# Patient Record
Sex: FEMALE | Race: WHITE
[De-identification: names, ages, dates, MRNs, and addresses within clinical notes are randomized per-mention and may not be internally consistent; named-entity substitution may affect disease eponyms.]

---

## 2019-08-31 ENCOUNTER — HOSPITAL ENCOUNTER (EMERGENCY)
Dept: HOSPITAL 41 - JD.ED | Age: 24
Discharge: HOME | End: 2019-08-31
Payer: COMMERCIAL

## 2019-08-31 DIAGNOSIS — Z98.890: ICD-10-CM

## 2019-08-31 DIAGNOSIS — O21.9: Primary | ICD-10-CM

## 2019-08-31 DIAGNOSIS — R42: ICD-10-CM

## 2019-08-31 DIAGNOSIS — Z3A.01: ICD-10-CM

## 2019-08-31 PROCEDURE — 99283 EMERGENCY DEPT VISIT LOW MDM: CPT

## 2019-08-31 PROCEDURE — 96375 TX/PRO/DX INJ NEW DRUG ADDON: CPT

## 2019-08-31 PROCEDURE — 83690 ASSAY OF LIPASE: CPT

## 2019-08-31 PROCEDURE — 84702 CHORIONIC GONADOTROPIN TEST: CPT

## 2019-08-31 PROCEDURE — 85007 BL SMEAR W/DIFF WBC COUNT: CPT

## 2019-08-31 PROCEDURE — 96361 HYDRATE IV INFUSION ADD-ON: CPT

## 2019-08-31 PROCEDURE — 96374 THER/PROPH/DIAG INJ IV PUSH: CPT

## 2019-08-31 PROCEDURE — 80053 COMPREHEN METABOLIC PANEL: CPT

## 2019-08-31 PROCEDURE — 85027 COMPLETE CBC AUTOMATED: CPT

## 2019-08-31 PROCEDURE — 86140 C-REACTIVE PROTEIN: CPT

## 2019-08-31 PROCEDURE — 36415 COLL VENOUS BLD VENIPUNCTURE: CPT

## 2019-08-31 PROCEDURE — 81001 URINALYSIS AUTO W/SCOPE: CPT

## 2019-08-31 NOTE — EDM.PDOC
ED HPI GENERAL MEDICAL PROBLEM





- General


Chief Complaint: Gastrointestinal Problem


Stated Complaint: 8 WKS PG. DIZZY,THROWING UP


Time Seen by Provider: 19 13:47


Source of Information: Reports: Patient


History Limitations: Reports: No Limitations





- History of Present Illness


INITIAL COMMENTS - FREE TEXT/NARRATIVE: 





Patient is a 24-year-old female who is approximately 8 weeks pregnant who 

presents to the ED with severe nausea with vomiting. This has been going on for 

the past few weeks. States she's been evaluated by her OB/GYN at 7 weeks 

pregnancy with no concerns. She was started on Zofran to which the patient has 

been taking and continues to have episodes of emesis. States last night the 

emesis progressively got worse. She's been experiencing intermittent episodes 

of vertigo. States she has the sensation of the room spinning with body 

position changes and also with turning her head left to right. Symptoms come 

and go. Has not had associated headache, vision changes, or any focal 

neurological deficits associated with this. She denies any chest pain, 

shortness of breath, abdominal pain, painful urination, vaginal bleeding, or 

any additional complaints. She states this morning after vomiting last time at 

0600 her urine was quite dark. She was able to drink some vitamin water only to 

vomit a hour later. Her urine was straw colored with admission to the ED. 

Pregnancy history  2, para 0, miscarriage 1.





She has no additional past medical history and currently taking prenatal 

vitamins, B6, Unisom, and Zofran. She denies smoking, alcohol use, or 

recreational drugs. Surgical history tonsillectomy, wisdom teeth, and plastic 

surgery to the lip. OB/GYN specialist is Dr. Mckinnon. 








- Related Data


 Allergies











Allergy/AdvReac Type Severity Reaction Status Date / Time


 


No Known Allergies Allergy   Verified 19 13:20











Home Meds: 


 Home Meds





Meclizine [Antivert] 12.5 mg PO TID PRN #21 tab 19 [Rx]


Ondansetron [Zofran ODT] 4 mg PO ASDIRECTED PRN #20 tab.dis 19 [Rx]











Past Medical History





- Past Surgical History


HEENT Surgical History: Reports: Tonsillectomy





Social & Family History





- Tobacco Use


Smoking Status *Q: Never Smoker


Second Hand Smoke Exposure: No





- Caffeine Use


Caffeine Use: Reports: None





- Recreational Drug Use


Recreational Drug Use: No





ED ROS GENERAL





- Review of Systems


Review Of Systems: ROS reveals no pertinent complaints other than HPI.





ED EXAM PREGNANCY





- Physical Exam


Exam: See Below


Exam Limited By: No Limitations


General Appearance: Alert, WD/WN, Mild Distress


Eye Exam: Bilateral Eye: Nystagmus (none noted), PERRL, Vision Changes (none 

noted)


Ears: Hearing Grossly Normal


Nose: Normal Inspection


Throat/Mouth: Normal Inspection, Normal Oropharynx, Normal Voice, No Airway 

Compromise


Head: Atraumatic, Normocephalic


Neck: Normal Inspection, Supple


Respiratory/Chest: No Respiratory Distress, Lungs Clear, Normal Breath Sounds, 

No Accessory Muscle Use, Chest Non-Tender


Cardiovascular: Normal Peripheral Pulses, Regular Rate, Rhythm


GI/Abdominal Exam: Normal Bowel Sounds, Soft, Non-Tender, No Organomegaly, No 

Distention


Back Exam: Normal Inspection.  No: CVA Tenderness (L), CVA Tenderness (R)


Extremities: Normal Inspection, Normal Range of Motion, Non-Tender, No Pedal 

Edema


Neurological: Alert, Oriented, CN II-XII Intact, Normal Cognition, No Motor/

Sensory Deficits


Psychiatric: Normal Affect, Normal Mood


Skin Exam: Warm, Dry, Intact, Normal Color





Course





- Vital Signs


Last Recorded V/S: 


 Last Vital Signs











Temp  98.7 F   19 13:21


 


Pulse  72   19 13:21


 


Resp  13   19 13:21


 


BP  98/68   19 13:21


 


Pulse Ox  100   19 13:21














- Orders/Labs/Meds


Orders: 


 Active Orders 24 hr











 Category Date Time Status


 


 Peripheral IV Care [RC] .AS DIRECTED Care  19 14:02 Active


 


 Peripheral IV Insertion Adult [OM.PC] Routine Oth  19 14:01 Ordered











Labs: 


 Laboratory Tests











  19 Range/Units





  13:35 13:35 15:45 


 


WBC  18.76 H    (3.98-10.04)  K/mm3


 


RBC  4.68    (3.98-5.22)  M/mm3


 


Hgb  14.1    (11.2-15.7)  gm/L


 


Hct  40.5    (34.1-44.9)  %


 


MCV  86.5    (79.4-94.8)  fl


 


MCH  30.1    (25.6-32.2)  pg


 


MCHC  34.8    (32.2-35.5)  g/dl


 


RDW Std Deviation  39.0    (36.4-46.3)  fL


 


Plt Count  193    (182-369)  K/mm3


 


MPV  13.4 H    (9.4-12.3)  fl


 


Neutrophils % (Manual)  90 H    (40-60)  %


 


Band Neutrophils %  0    (0-10)  %


 


Lymphocytes % (Manual)  5 L    (20-40)  %


 


Atypical Lymphs %  0    %


 


Monocytes % (Manual)  4    (2-10)  %


 


Eosinophils % (Manual)  0 L    (0.7-5.8)  %


 


Basophils % (Manual)  1    (0.1-1.2)  


 


Platelet Estimate  Adequate    


 


Plt Morphology Comment      


 


RBC Morph Comment  Normal    


 


Sodium   139   (136-145)  mEq/L


 


Potassium   3.8   (3.5-5.1)  mEq/L


 


Chloride   104   ()  mEq/L


 


Carbon Dioxide   23   (21-32)  mEq/L


 


Anion Gap   15.8 H   (5-15)  


 


BUN   7   (7-18)  mg/dL


 


Creatinine   0.7   (0.55-1.02)  mg/dL


 


Est Cr Clr Drug Dosing   115.01   mL/min


 


Estimated GFR (MDRD)   > 60   (>60)  mL/min


 


BUN/Creatinine Ratio   10.0 L   (14-18)  


 


Glucose   91   ()  mg/dL


 


Calcium   9.7   (8.5-10.1)  mg/dL


 


Total Bilirubin   0.6   (0.2-1.0)  mg/dL


 


AST   53 H   (15-37)  U/L


 


ALT   139 H   (14-59)  U/L


 


Alkaline Phosphatase   79   ()  U/L


 


C-Reactive Protein   0.3   (<1.0)  mg/dL


 


Total Protein   7.3   (6.4-8.2)  g/dl


 


Albumin   3.8   (3.4-5.0)  g/dl


 


Globulin   3.5   gm/dL


 


Albumin/Globulin Ratio   1.1   (1-2)  


 


Lipase   107   ()  U/L


 


HCG, Quant   394512.0   mIU/mL


 


Urine Color    Yellow  (Yellow)  


 


Urine Appearance    Clear  (Clear)  


 


Urine pH    6.0  (5.0-8.0)  


 


Ur Specific Gravity    1.020  (1.005-1.030)  


 


Urine Protein    Negative  (Negative)  


 


Urine Glucose (UA)    Negative  (Negative)  


 


Urine Ketones    1+ H  (Negative)  


 


Urine Occult Blood    Negative  (Negative)  


 


Urine Nitrite    Negative  (Negative)  


 


Urine Bilirubin    Negative  (Negative)  


 


Urine Urobilinogen    0.2  (0.2-1.0)  


 


Ur Leukocyte Esterase    Negative  (Negative)  


 


Urine RBC    0-5  (0-5)  /hpf


 


Urine WBC    0-5  (0-5)  /hpf


 


Ur Squamous Epith Cells    0-5  (0-5)  /hpf


 


Urine Bacteria    Few  (FEW)  /hpf


 


Urine Mucus    Moderate H  (FEW)  /hpf











Meds: 


Medications














Discontinued Medications














Generic Name Dose Route Start Last Admin





  Trade Name Freq  PRN Reason Stop Dose Admin


 


Diphenhydramine HCl  50 mg  19 14:01  19 14:19





  Benadryl  IVPUSH  19 14:02  50 mg





  ONETIME ONE   Administration





     





     





     





     


 


Sodium Chloride  1,000 mls @ 999 mls/hr  19 14:01  19 14:19





  Normal Saline  IV  19 15:01  999 mls/hr





  ONETIME ONE   Administration





     





     





     





     


 


Ondansetron HCl  4 mg  19 14:01  19 14:19





  Zofran  IVPUSH  19 14:02  4 mg





  ONETIME ONE   Administration





     





     





     





     


 


Sodium Chloride  10 ml  19 14:01  19 14:20





  Saline Flush  FLUSH   10 ml





  ASDIRECTED PRN   Administration





  Keep Vein Open   





     





     





     














- Re-Assessments/Exams


Free Text/Narrative Re-Assessment/Exam: 





On exam patient has no focal neurological deficits. Mouth is mildly dry. Will 

order IV with normal saline bolus 1 L, Zofran 4 mg IVP, and Benadryl 50 mg by 

IV. Initial labs and studies will include: CBC, chem 14, HCG quantitative, CRP, 

lipase, and urinalysis.





Labs reviewed:WBC 18.76, Hgb 14.1, N% 90, N# 0  sodium potassium, chloride, CO2 

are normal. AG mildly elevated 15.8. Creatinine 0.7. Glucose 91. AST 53 and ALT 

are 139. CRP normal. HCG quantitative 257,262.  UA positive for ketones and 

moderate mucus.





1600 Reassessment, patients vitals are stable.  She has had no further vertigo 

episodes and nausea.





1647 Per Nursing staff patient is tolerating the crackers and jello. I will 

discharge patient home.  Return precautions discussed with the patient and 

. She had no further questions or concerns and agreed with plan. 

Discharge instructions as documented. 








Departure





- Departure


Time of Disposition: 16:59


Disposition: Home, Self-Care 01


Condition: Good


Clinical Impression: 


 Vomiting during pregnancy, Vertigo, Pregnancy test positive, Vomiting





Nausea & vomiting


Qualifiers:


 Vomiting type: unspecified Vomiting Intractability: non-intractable Qualified 

Code(s): R11.2 - Nausea with vomiting, unspecified








- Discharge Information


Prescriptions: 


Meclizine [Antivert] 12.5 mg PO TID PRN #21 tab


 PRN Reason: Dizziness


Ondansetron [Zofran ODT] 4 mg PO ASDIRECTED PRN #20 tab.dis


 PRN Reason: Vomiting


Instructions:  Eating Plan for Hyperemesis Gravidarum, Hyperemesis Gravidarum, 

Vertigo, Easy-to-Read, Warning Signs During Pregnancy, Nausea and Vomiting, 

Adult


Referrals: 


Isidra Robles MD [Primary Care Provider] - 


Forms:  ED Department Discharge


Additional Instructions: 


Continue to drink small amounts of liquids more frequently such as: Gatorade, 

Powerade, Pedialyte, and/or vitamin water. Eat small amounts of crackers, Jell-O

, soup as tolerated. Refrain from any items that cause nausea and vomiting. 

Utilize the Zofran as prescribed for nausea and vomiting. In addition for the 

intermittent vertigo may utilize meclizine 12.5 mg 3 times a day. PRN.  Please 

call and schedule an appt with OB/GYN for this coming week for reevaluation. 

Please return to the E.D. for any new or worsening symptoms. 





- My Orders


Last 24 Hours: 


My Active Orders





19 14:01


Peripheral IV Insertion Adult [OM.PC] Routine 





19 14:02


Peripheral IV Care [RC] .AS DIRECTED 














- Assessment/Plan


Last 24 Hours: 


My Active Orders





19 14:01


Peripheral IV Insertion Adult [OM.PC] Routine 





19 14:02


Peripheral IV Care [RC] .AS DIRECTED

## 2020-04-10 ENCOUNTER — HOSPITAL ENCOUNTER (INPATIENT)
Dept: HOSPITAL 41 - JD.OBCHECK | Age: 25
LOS: 2 days | Discharge: HOME | DRG: 560 | End: 2020-04-12
Attending: OBSTETRICS & GYNECOLOGY | Admitting: OBSTETRICS & GYNECOLOGY
Payer: COMMERCIAL

## 2020-04-10 DIAGNOSIS — O42.92: ICD-10-CM

## 2020-04-10 DIAGNOSIS — O48.0: Primary | ICD-10-CM

## 2020-04-10 DIAGNOSIS — Z20.828: ICD-10-CM

## 2020-04-10 DIAGNOSIS — Z3A.40: ICD-10-CM

## 2020-04-10 PROCEDURE — 3E0R3BZ INTRODUCTION OF ANESTHETIC AGENT INTO SPINAL CANAL, PERCUTANEOUS APPROACH: ICD-10-PCS | Performed by: OBSTETRICS & GYNECOLOGY

## 2020-04-10 PROCEDURE — 10907ZC DRAINAGE OF AMNIOTIC FLUID, THERAPEUTIC FROM PRODUCTS OF CONCEPTION, VIA NATURAL OR ARTIFICIAL OPENING: ICD-10-PCS | Performed by: OBSTETRICS & GYNECOLOGY

## 2020-04-10 PROCEDURE — U0002 COVID-19 LAB TEST NON-CDC: HCPCS

## 2020-04-10 PROCEDURE — 0HQ9XZZ REPAIR PERINEUM SKIN, EXTERNAL APPROACH: ICD-10-PCS | Performed by: OBSTETRICS & GYNECOLOGY

## 2020-04-10 PROCEDURE — P9016 RBC LEUKOCYTES REDUCED: HCPCS

## 2020-04-10 RX ADMIN — FENTANYL CITRATE PRN MCG: 50 INJECTION, SOLUTION INTRAMUSCULAR; INTRAVENOUS at 22:25

## 2020-04-10 RX ADMIN — FENTANYL CITRATE PRN MCG: 50 INJECTION, SOLUTION INTRAMUSCULAR; INTRAVENOUS at 14:55

## 2020-04-10 NOTE — PCM.PNLD
Labor Progress Note





- VS & Meds


Vital Signs: 


 Last Vital Signs











Temp  36.6 C   04/10/20 05:32


 


Pulse  94   04/10/20 05:32


 


Resp  16   04/10/20 05:32


 


BP  119/74   04/10/20 05:32


 


Pulse Ox  99   04/10/20 05:32











Active Medications: 


 Current Medications





Acetaminophen (Tylenol)  650 mg PO Q4H PRN


   PRN Reason: Pain (Mild 1-3) and fever


Calcium Carbonate/Glycine (Tums)  1,000 mg PO Q2H PRN


   PRN Reason: Indigestion


Lactated Ringer's (Ringers, Lactated)  1,000 mls @ 100 mls/hr IV ASDIRECTED MURALI


   Last Admin: 04/10/20 14:03 Dose:  999 mls/hr


Oxytocin/Lactated Ringer's (Pitocin In Lr 10 Units/1,000 Ml)  10 unit in 1,000 

mls @ 12 mls/hr IV TITRATE MURALI; Protocol


Oxytocin/Lactated Ringer's (Pitocin In Lr 10 Units/1,000 Ml)  10 unit in 1,000 

mls @ 100 mls/hr IV .CONTINUOUS MURALI


Nalbuphine HCl (Nubain)  10 mg IVPUSH Q2H PRN


   PRN Reason: Pain


   Last Admin: 04/10/20 12:01 Dose:  10 mg


Ondansetron HCl (Zofran)  4 mg IVPUSH Q4H PRN


   PRN Reason: Nausea/Vomiting


Sodium Chloride (Saline Flush)  10 ml FLUSH ASDIRECTED PRN


   PRN Reason: Keep Vein Open











- Uterine Contractions


Uterine Monitoring Mode: External [place]


Contraction Intensity: Moderate to Strong


Uterine Resting Tone: Soft





- Fetal Monitoring


Fetal Monitor Mode: External Ultrasound


Fetal Heart Rate (FHR) Baseline: 135


Fetal Heart Rate (FHR) Variability: Moderate (6-25 bmp)


Fetal Accelerations: Present, 15x15


Fetal Decelerations: None


Fetal Strip Review: Category I





- Vaginal Exam


Dilation (cm): 6


Effacement (Percent): 90


Station: -1


Cervical Position: Anterior





- Labor Progress (Free Text)


Labor Progress: 





Doing well, but more uncomfortable.  Had dose of nubain, but not relieving 

pain.  Requesting epidural.  





Otherwise will plan on sign out to Dr. Mckinnon at 1600

## 2020-04-10 NOTE — PCM.SN
- Free Text/Narrative


Note: 





Stage I - patient presented with labor. Epidural for anesthesia. AROM of 

meconium stained fluid. Progressed slowly to complete with overall reassuring 

FHT.





Stage II - Patient pushed 3 hours and expressed desire for assistance. With 

head at +2/5 station in JF position vacuum applied. Over three contractions 

with good maternal effort head brought down to . With continued 

maternal effort without vacuum head delivered. Shoulder dystocia reduced with 

alyssa and suprapubic pressure over 90 seconds. Cord clamped and cut and 

limp baby taken to warmer.  





Stage III -  of placenta. Significant bleeding immediately. Pitocin 

initiated. Bimanual exam revealed placental fragments. Fentanyl given and 

manual evacuation of clot and fragments of placenta on anterior wall of uterus. 

Buccal cytotec x 600 given. CBC ordered. Bladder drained. Small first degree 

laceration repaired with 3-0 vicryl.

## 2020-04-10 NOTE — PCM.LDHP
L&D History of Present Illness





- General


Date of Service: 04/10/20


Admit Problem/Dx: 


 Patient Status Order with Admit Dx/Problem





04/10/20 06:10


Patient Status [ADT] Routine 








 Admission Diagnosis/Problem











Admission Diagnosis/Problem    Pregnancy














Source of Information: Patient


History Limitations: Reports: No Limitations





- History of Present Illness


Introduction:: 





Patient is a 24 y/o  at 40 4/7 wks present with SROM/early labor.  

Thinks this started around 2300 yesterday.  Small amounts that are slightly 

yellow.  No other issues. 





- Related Data


Allergies/Adverse Reactions: 


 Allergies











Allergy/AdvReac Type Severity Reaction Status Date / Time


 


No Known Allergies Allergy   Verified 04/10/20 05:25











Home Medications: 


 Home Meds





Meclizine [Antivert] 12.5 mg PO TID PRN #21 tab 19 [Rx]


Ondansetron [Zofran ODT] 4 mg PO ASDIRECTED PRN #20 tab.dis 19 [Rx]


Calcium Carbonate [Tums] 500 mg PO 04/10/20 [History]











Past Medical History


Gastrointestinal History: Reports: GERD


OB/GYN History: Reports: Pregnancy, Spontaneous 


: 2


Para: 0


LMP (Approximate): Pregnant





- Past Surgical History


HEENT Surgical History: Reports: Oral Surgery (tooth extraction), Tonsillectomy





Social & Family History





- Family History


Family Medical History: Noncontributory





- Tobacco Use


Smoking Status *Q: Never Smoker


Second Hand Smoke Exposure: No





- Caffeine Use


Caffeine Use: Reports: Tea





- Alcohol Use


Alcohol Use History: No





- Recreational Drug Use


Recreational Drug Use: No





H&P Review of Systems





- Review of Systems:


Review Of Systems: See Below


General: Reports: No Symptoms


Pulmonary: Reports: No Symptoms


Cardiovascular: Reports: No Symptoms


Gastrointestinal: Reports: No Symptoms


Genitourinary: Reports: No Symptoms


Musculoskeletal: Reports: Back Pain


Psychiatric: Reports: No Symptoms


Neurological: Reports: No Symptoms





L&D Exam





- Exam


Exam: See Below





- Vital Signs


Vital Signs: 


 Last Vital Signs











Temp  36.6 C   04/10/20 05:32


 


Pulse  94   04/10/20 05:32


 


Resp  16   04/10/20 05:32


 


BP  119/74   04/10/20 05:32


 


Pulse Ox  99   04/10/20 05:32











Weight: 103.646 kg





- OB Specific


Contraction Intensity: Mild to Moderate


Fetal Movement: Active


Fetal Heart Tones: Present


Fetal Heart Tones per Min: 145


Fetal Heart Rate (FHR) Variability: Moderate (6-25 bmp)


Birth Presentation: Vertex





- Glaser Score


Glaser Score Cervix Position: Midposition


Glaser Score Consistency: Soft


Glaser Score Effacement: >80%


Glaser Score Dilation: > 5 cm


Glaser Score Infant's Station: -1 ,0


Glaser Score Total: 11





- Exam


General: Alert, Oriented, Cooperative


Lungs: Clear to Auscultation, Normal Respiratory Effort


Cardiovascular: Regular Rate, Regular Rhythm


GI/Abdominal Exam: Soft, Non-Tender


Genitourinary: Normal external exam


Extremities: Normal Inspection


Skin: Warm, Dry, Intact





- Patient Data


Lab Results Last 24 hrs: 


 Laboratory Results - last 24 hr











  04/10/20 04/10/20 Range/Units





  05:38 06:25 


 


WBC   16.70 H  (3.98-10.04)  K/mm3


 


RBC   4.39  (3.98-5.22)  M/mm3


 


Hgb   12.5  D  (11.2-15.7)  gm/dl


 


Hct   38.2  (34.1-44.9)  %


 


MCV   87.0  (79.4-94.8)  fl


 


MCH   28.5  (25.6-32.2)  pg


 


MCHC   32.7  (32.2-35.5)  g/dl


 


RDW Std Deviation   44.7  (36.4-46.3)  fL


 


Plt Count   114 L D  (182-369)  K/mm3


 


MPV   12.8 H  (9.4-12.3)  fl


 


Neut % (Auto)   75.8 H  (34.0-71.1)  %


 


Lymph % (Auto)   13.0 L  (19.3-51.7)  %


 


Mono % (Auto)   10.1  (4.7-12.5)  %


 


Eos % (Auto)   0.5 L  (0.7-5.8)  


 


Baso % (Auto)   0.2  (0.1-1.2)  %


 


Neut # (Auto)   12.66 H  (1.56-6.13)  K/mm3


 


Lymph # (Auto)   2.17  (1.18-3.74)  K/mm3


 


Mono # (Auto)   1.68 H  (0.24-0.36)  K/mm3


 


Eos # (Auto)   0.09  (0.04-0.36)  K/mm3


 


Baso # (Auto)   0.03  (0.01-0.08)  K/mm3


 


Manual Slide Review   Normal smear  


 


Fetal Membrane Rupture  Negative   











Result Diagrams: 


 04/10/20 06:25








- Problem List


(1) 40 weeks gestation of pregnancy


SNOMED Code(s): 38831983


   ICD Code: Z3A.40 - 40 WEEKS GESTATION OF PREGNANCY   Status: Acute   Current 

Visit: Yes   





(2) Rh negative state in antepartum period


SNOMED Code(s): 373804388


   ICD Code: O26.899 - OTH PREGNANCY RELATED CONDITIONS, UNSPECIFIED TRIMESTER; 

Z67.91 - UNSPECIFIED BLOOD TYPE, RH NEGATIVE   Status: Acute   Current Visit: 

Yes   





(3) Rubella non-immune status, antepartum


SNOMED Code(s): 529063413


   ICD Code: O99.89 - OTH DISEASES AND CONDITIONS COMPL PREG/CHLDBRTH; Z28.3 - 

UNDERIMMUNIZATION STATUS   Status: Acute   Current Visit: Yes   





(4) Premature rupture of membranes


SNOMED Code(s): 59341844


   ICD Code: O42.90 - WENDY ROM, 7TH0 BETW RUPT & ONST LABR, UNSP WEEKS OF GEST 

  Status: Acute   Current Visit: Yes   


Qualifiers: 


   PROM onset of labor timing: unspecified duration between rupture of 

membranes and onset of labor   PROM gestational age: full term   Qualified Code(

s): O42.92 - Full-term premature rupture of membranes, unspecified as to length 

of time between rupture and onset of labor   


Problem List Initiated/Reviewed/Updated: Yes


Orders Last 24hrs: 


 Active Orders 24 hr











 Category Date Time Status


 


 Patient Status [ADT] Routine ADT  04/10/20 06:10 Active


 


 Activity as Tolerated [RC] PFP Care  04/10/20 06:10 Active


 


 Communication Order [RC] ASDIRECTED Care  04/10/20 06:10 Active


 


 Fetal Heart Tones [RC] ASDIRECTED Care  04/10/20 06:10 Active


 


 Notify Provider [RC] PFP Care  04/10/20 06:10 Active


 


 Notify Provider [RC] PRN Care  04/10/20 06:10 Active


 


 Peripheral IV Care [RC] .AS DIRECTED Care  04/10/20 06:10 Active


 


 Urinary Catheter Assessment [RC] ASDIRECTED Care  04/10/20 06:09 Active


 


 Vital Signs [RC] PER UNIT ROUTINE Care  04/10/20 05:24 Active


 


 Vital Signs [RC] PER UNIT ROUTINE Care  04/10/20 06:10 Active


 


 Regular Diet [DIET] Diet  04/10/20 Breakfast Active


 


 RAPID PLASMA REAGIN,RPR [CHEM] Routine Lab  04/10/20 06:25 Received


 


 Acetaminophen [Tylenol] Med  04/10/20 06:09 Active





 650 mg PO Q4H PRN   


 


 Calcium Carbonate [Tums] Med  04/10/20 06:09 Active





 1,000 mg PO Q2H PRN   


 


 Lactated Ringers [Ringers, Lactated] 1,000 ml Med  04/10/20 06:15 Active





 IV ASDIRECTED   


 


 Nalbuphine [Nubain] Med  04/10/20 06:09 Active





 10 mg IVPUSH Q2H PRN   


 


 Ondansetron [Zofran] Med  04/10/20 06:09 Active





 4 mg IVPUSH Q4H PRN   


 


 Oxytocin/Lactated Ringers [Pitocin in LR 10 Units/1,000 Med  04/10/20 06:15 

Active





 ML]   





 10 unit in 1,000 ml IV .CONTINUOUS   


 


 Oxytocin/Lactated Ringers [Pitocin in LR 10 Units/1,000 Med  04/10/20 06:15 

Active





 ML]   





 10 unit in 1,000 ml IV TITRATE   


 


 Sodium Chloride 0.9% [Saline Flush] Med  04/10/20 06:09 Active





 10 ml FLUSH ASDIRECTED PRN   


 


 Electronic Fetal Heart Tones Ext w TOCO [WOMSER] Oth  04/10/20 06:10 Ordered





 Routine   


 


 Electronic Fetal Heart Tones Internal [WOMSER] Per Unit Oth  04/10/20 06:10 

Ordered





 Routine   


 


 Peripheral IV Insertion Adult [OM.PC] Routine Oth  04/10/20 06:10 Ordered


 


 Resuscitation Status Routine Resus Stat  04/10/20 05:24 Ordered








 Medication Orders





Acetaminophen (Tylenol)  650 mg PO Q4H PRN


   PRN Reason: Pain (Mild 1-3) and fever


Calcium Carbonate/Glycine (Tums)  1,000 mg PO Q2H PRN


   PRN Reason: Indigestion


Lactated Ringer's (Ringers, Lactated)  1,000 mls @ 100 mls/hr IV ASDIRECTED MURALI


Oxytocin/Lactated Ringer's (Pitocin In Lr 10 Units/1,000 Ml)  10 unit in 1,000 

mls @ 12 mls/hr IV TITRATE MURALI; Protocol


Oxytocin/Lactated Ringer's (Pitocin In Lr 10 Units/1,000 Ml)  10 unit in 1,000 

mls @ 100 mls/hr IV .CONTINUOUS MURALI


Nalbuphine HCl (Nubain)  10 mg IVPUSH Q2H PRN


   PRN Reason: Pain


Ondansetron HCl (Zofran)  4 mg IVPUSH Q4H PRN


   PRN Reason: Nausea/Vomiting


Sodium Chloride (Saline Flush)  10 ml FLUSH ASDIRECTED PRN


   PRN Reason: Keep Vein Open








Assessment/Plan Comment:: 





* Labs done


* GBS negative, no need for antibiotics 


* Rupture done of remainder of forebag 


* Pain management per patient preference 


* Anticipate

## 2020-04-10 NOTE — PCM.PREANE
Preanesthetic Assessment





- Anesthesia/Transfusion/Family Hx


Anesthesia History: Prior Anesthesia Without Reaction


Transfusion History: No Prior Transfusion(s)





- Review of Systems


General: No Symptoms


Pulmonary: No Symptoms


Cardiovascular: No Symptoms


Gastrointestinal: No Symptoms


Neurological: No Symptoms


Other: Reports: None





- Physical Assessment


NPO Status Date: 04/10/20


NPO Status Time: 04:00


Vital Signs: 





 Last Vital Signs











Temp  97.9 F   04/10/20 05:32


 


Pulse  94   04/10/20 05:32


 


Resp  16   04/10/20 05:32


 


BP  119/74   04/10/20 05:32


 


Pulse Ox  99   04/10/20 05:32











Height: 1.68 m


Weight: 103.646 kg


ASA Class: 2


Mental Status: Alert & Oriented x3


Airway Class: Mallampati = 2


Dentition: Reports: Normal Dentition


Thyro-Mental Finger Breadths: 3


Mouth Opening Finger Breadths: 3


ROM/Head Extension: Full


Lungs: Clear to Auscultation, Normal Respiratory Effort


Cardiovascular: Regular Rate, Regular Rhythm





- Lab


Values: 





 Laboratory Last Values











WBC  16.70 K/mm3 (3.98-10.04)  H  04/10/20  06:25    


 


RBC  4.39 M/mm3 (3.98-5.22)   04/10/20  06:25    


 


Hgb  12.5 gm/dl (11.2-15.7)  D 04/10/20  06:25    


 


Hct  38.2 % (34.1-44.9)   04/10/20  06:25    


 


MCV  87.0 fl (79.4-94.8)   04/10/20  06:25    


 


MCH  28.5 pg (25.6-32.2)   04/10/20  06:25    


 


MCHC  32.7 g/dl (32.2-35.5)   04/10/20  06:25    


 


RDW Std Deviation  44.7 fL (36.4-46.3)   04/10/20  06:25    


 


Plt Count  114 K/mm3 (182-369)  L D 04/10/20  06:25    


 


MPV  12.8 fl (9.4-12.3)  H  04/10/20  06:25    


 


Neut % (Auto)  75.8 % (34.0-71.1)  H  04/10/20  06:25    


 


Lymph % (Auto)  13.0 % (19.3-51.7)  L  04/10/20  06:25    


 


Mono % (Auto)  10.1 % (4.7-12.5)   04/10/20  06:25    


 


Eos % (Auto)  0.5  (0.7-5.8)  L  04/10/20  06:25    


 


Baso % (Auto)  0.2 % (0.1-1.2)   04/10/20  06:25    


 


Neut # (Auto)  12.66 K/mm3 (1.56-6.13)  H  04/10/20  06:25    


 


Lymph # (Auto)  2.17 K/mm3 (1.18-3.74)   04/10/20  06:25    


 


Mono # (Auto)  1.68 K/mm3 (0.24-0.36)  H  04/10/20  06:25    


 


Eos # (Auto)  0.09 K/mm3 (0.04-0.36)   04/10/20  06:25    


 


Baso # (Auto)  0.03 K/mm3 (0.01-0.08)   04/10/20  06:25    


 


Manual Slide Review  Normal smear   04/10/20  06:25    


 


Fetal Membrane Rupture  Negative   04/10/20  05:38    














- Allergies


Allergies/Adverse Reactions: 


 Allergies











Allergy/AdvReac Type Severity Reaction Status Date / Time


 


No Known Allergies Allergy   Verified 04/10/20 05:25














- Acknowledgements


Anesthesia Type Planned: Epidural ( )


Pt an Appropriate Candidate for the Planned Anesthesia: Yes


Alternatives and Risks of Anesthesia Discussed w Pt/Guardian: Yes


Pt/Guardian Understands and Agrees with Anesthesia Plan: Yes





PreAnesthesia Questionnaire





- Past Health History


Medical/Surgical History: Denies Medical/Surgical History


Gastrointestinal History: Reports: GERD (also pregnancy exacerbated)


OB/GYN History: Reports: Pregnancy, Spontaneous 


Endocrine/Metabolic History: Reports: Obesity/BMI 30+





- Past Surgical History


HEENT Surgical History: Reports: Oral Surgery (tooth extraction), Tonsillectomy





- SUBSTANCE USE


Smoking Status *Q: Never Smoker


Second Hand Smoke Exposure: No


Recreational Drug Use History: No





- HOME MEDS


Home Medications: 


 Home Meds





Meclizine [Antivert] 12.5 mg PO TID PRN #21 tab 19 [Rx]


Ondansetron [Zofran ODT] 4 mg PO ASDIRECTED PRN #20 tab.dis 19 [Rx]


Calcium Carbonate [Tums] 500 mg PO 04/10/20 [History]











- CURRENT (IN HOUSE) MEDS


Current Meds: 





 Current Medications





Acetaminophen (Tylenol)  650 mg PO Q4H PRN


   PRN Reason: Pain (Mild 1-3) and fever


Calcium Carbonate/Glycine (Tums)  1,000 mg PO Q2H PRN


   PRN Reason: Indigestion


Diphenhydramine HCl (Benadryl)  25 mg IVPUSH Q6H PRN


   PRN Reason: pruritis


Ephedrine Sulfate (Ephedrine Sulfate)  5 mg IVPUSH ASDIRECTED PRN


   PRN Reason: Hypotension


Fentanyl (Sublimaze)  100 mcg EPIDUR Q3H PRN


   PRN Reason: Pain


Fentanyl/Bupivacaine HCl (Fentanyl/Bupivacaine/Ns 2 Mcg-0.125% 100 Ml)  100 ml 

EPIDUR ASDIRECTED PRN


   PRN Reason: Pain


Lactated Ringer's (Ringers, Lactated)  1,000 mls @ 100 mls/hr IV ASDIRECTED MURALI


   Last Admin: 04/10/20 14:03 Dose:  999 mls/hr


Oxytocin/Lactated Ringer's (Pitocin In Lr 10 Units/1,000 Ml)  10 unit in 1,000 

mls @ 12 mls/hr IV TITRATE MURALI; Protocol


Oxytocin/Lactated Ringer's (Pitocin In Lr 10 Units/1,000 Ml)  10 unit in 1,000 

mls @ 100 mls/hr IV .CONTINUOUS MURALI


Nalbuphine HCl (Nubain)  10 mg IVPUSH Q2H PRN


   PRN Reason: Pain


   Last Admin: 04/10/20 12:01 Dose:  10 mg


Ondansetron HCl (Zofran)  4 mg IVPUSH Q4H PRN


   PRN Reason: Nausea/Vomiting


Sodium Chloride (Saline Flush)  10 ml FLUSH ASDIRECTED PRN


   PRN Reason: Keep Vein Open

## 2020-04-10 NOTE — PCM.PNLD
Labor Progress Note





- VS & Meds


Vital Signs: 


 Last Vital Signs











Temp  36.6 C   04/10/20 05:32


 


Pulse  94   04/10/20 05:32


 


Resp  16   04/10/20 05:32


 


BP  119/74   04/10/20 05:32


 


Pulse Ox  99   04/10/20 05:32











Active Medications: 


 Current Medications





Acetaminophen (Tylenol)  650 mg PO Q4H PRN


   PRN Reason: Pain (Mild 1-3) and fever


Calcium Carbonate/Glycine (Tums)  1,000 mg PO Q2H PRN


   PRN Reason: Indigestion


Diphenhydramine HCl (Benadryl)  25 mg IVPUSH Q6H PRN


   PRN Reason: pruritis


Ephedrine Sulfate (Ephedrine Sulfate)  5 mg IVPUSH ASDIRECTED PRN


   PRN Reason: Hypotension


Fentanyl (Sublimaze)  100 mcg EPIDUR Q3H PRN


   PRN Reason: Pain


   Last Admin: 04/10/20 14:55 Dose:  100 mcg


Fentanyl/Bupivacaine HCl (Fentanyl/Bupivacaine/Ns 2 Mcg-0.125% 100 Ml)  100 ml 

EPIDUR ASDIRECTED PRN


   PRN Reason: Pain


   Last Admin: 04/10/20 14:55 Dose:  100 ml


Lactated Ringer's (Ringers, Lactated)  1,000 mls @ 100 mls/hr IV ASDIRECTED MURALI


   Last Infusion: 04/10/20 16:02 Dose:  100 mls/hr


Oxytocin/Lactated Ringer's (Pitocin In Lr 10 Units/1,000 Ml)  10 unit in 1,000 

mls @ 12 mls/hr IV TITRATE MURALI; Protocol


Oxytocin/Lactated Ringer's (Pitocin In Lr 10 Units/1,000 Ml)  10 unit in 1,000 

mls @ 100 mls/hr IV .CONTINUOUS MURALI


Nalbuphine HCl (Nubain)  10 mg IVPUSH Q2H PRN


   PRN Reason: Pain


   Last Admin: 04/10/20 12:01 Dose:  10 mg


Ondansetron HCl (Zofran)  4 mg IVPUSH Q4H PRN


   PRN Reason: Nausea/Vomiting


Sodium Chloride (Saline Flush)  10 ml FLUSH ASDIRECTED PRN


   PRN Reason: Keep Vein Open











- Uterine Contractions


Uterine Monitoring Mode: External [address] [place] [address]


Contraction Intensity: Moderate to Strong


Uterine Resting Tone: Soft





- Fetal Monitoring


Fetal Monitor Mode: External Ultrasound


Fetal Heart Rate (FHR) Baseline: 135


Fetal Heart Rate (FHR) Variability: Moderate (6-25 bmp)


Fetal Accelerations: Present, 15x15


Fetal Decelerations: None


Fetal Strip Review: Category I





- Vaginal Exam


Dilation (cm): 10


Effacement (Percent): 100


Station: 0


Cervical Position: Anterior





- Labor Progress (Free Text)


Labor Progress: 





Pushing well. Comfortable with epidural.

## 2020-04-11 PROCEDURE — 8E0ZXY6 ISOLATION: ICD-10-PCS | Performed by: OBSTETRICS & GYNECOLOGY

## 2020-04-11 RX ADMIN — WITCH HAZEL PRN TUB: 500 SOLUTION RECTAL; TOPICAL at 00:15

## 2020-04-11 RX ADMIN — Medication PRN CAN: at 00:15

## 2020-04-11 NOTE — PCM.PNPP
- General Info


Date of Service: 20


Subjective Update: 





Only complaint significant shortness of breath. No pain. No chest pain or 

palpitations. 


Functional Status: Reports: Pain Controlled, Tolerating Diet, Urinating.  Denies

: Ambulating





- Review of Systems


General: Reports: No Symptoms


HEENT: Reports: No Symptoms


Pulmonary: Reports: Shortness of Breath.  Denies: Pleuritic Chest Pain, Wheezing


Cardiovascular: Reports: No Symptoms


Gastrointestinal: Reports: No Symptoms


Genitourinary: Reports: No Symptoms


Musculoskeletal: Reports: No Symptoms


Skin: Reports: No Symptoms


Neurological: Reports: No Symptoms


Psychiatric: Reports: No Symptoms





- General Info


Date of Service: 20





- Patient Data


Vital Signs - Most Recent: 


 Last Vital Signs











Temp  36.7 C   20 10:14


 


Pulse  88   20 10:14


 


Resp  28 H  20 10:14


 


BP  95/61   20 10:14


 


Pulse Ox  94 L  20 10:14











Weight - Most Recent: 103.646 kg


I&O - Last 24 Hours: 


 Intake & Output











 04/10/20 04/11/20 04/11/20





 22:59 06:59 14:59


 


Intake Total 1000 1600 1


 


Output Total 300  


 


Balance 700 1600 1











Lab Results - Last 24 Hours: 


 Laboratory Results - last 24 hr











  04/10/20 04/10/20 04/11/20 Range/Units





  06:25 21:50 04:06 


 


WBC   27.80 H   (3.98-10.04)  K/mm3


 


RBC   3.88 L   (3.98-5.22)  M/mm3


 


Hgb   11.1 L   (11.2-15.7)  gm/dl


 


Hct   34.1   (34.1-44.9)  %


 


MCV   87.9   (79.4-94.8)  fl


 


MCH   28.6   (25.6-32.2)  pg


 


MCHC   32.6   (32.2-35.5)  g/dl


 


RDW Std Deviation   44.4   (36.4-46.3)  fL


 


Plt Count   139 L   (182-369)  K/mm3


 


MPV   13.3 H   (9.4-12.3)  fl


 


Neut % (Auto)   86.1 H   (34.0-71.1)  %


 


Lymph % (Auto)   3.8 L   (19.3-51.7)  %


 


Mono % (Auto)   9.6   (4.7-12.5)  %


 


Eos % (Auto)   0 L   (0.7-5.8)  


 


Baso % (Auto)   0.1   (0.1-1.2)  %


 


Neut # (Auto)   23.94 H   (1.56-6.13)  K/mm3


 


Lymph # (Auto)   1.06 L   (1.18-3.74)  K/mm3


 


Mono # (Auto)   2.67 H   (0.24-0.36)  K/mm3


 


Eos # (Auto)   0.00 L   (0.04-0.36)  K/mm3


 


Baso # (Auto)   0.02   (0.01-0.08)  K/mm3


 


Manual Slide Review   Abnormal smear   


 


RPR  Non-reactive    (NONREACTIVE)  


 


Blood Type    A NEGATIVE  


 


Gel Antibody Screen    Positive  


 


Fetal Screen    3 ros/5 flds - neg  


 


RhIG Candidate?    Yes  


 


Rhogam Indicated    Yes, baby rh pos H  














  20 Range/Units





  04:06 


 


WBC  28.19 H  (3.98-10.04)  K/mm3


 


RBC  3.31 L  (3.98-5.22)  M/mm3


 


Hgb  9.3 L D  (11.2-15.7)  gm/dl


 


Hct  29.0 L  (34.1-44.9)  %


 


MCV  87.6  (79.4-94.8)  fl


 


MCH  28.1  (25.6-32.2)  pg


 


MCHC  32.1 L  (32.2-35.5)  g/dl


 


RDW Std Deviation  43.2  (36.4-46.3)  fL


 


Plt Count  140 L  (182-369)  K/mm3


 


MPV  13.2 H  (9.4-12.3)  fl


 


Neut % (Auto)  80.8 H  (34.0-71.1)  %


 


Lymph % (Auto)  5.8 L  (19.3-51.7)  %


 


Mono % (Auto)  12.9 H  (4.7-12.5)  %


 


Eos % (Auto)  0 L  (0.7-5.8)  


 


Baso % (Auto)  0.1  (0.1-1.2)  %


 


Neut # (Auto)  22.77 H  (1.56-6.13)  K/mm3


 


Lymph # (Auto)  1.63  (1.18-3.74)  K/mm3


 


Mono # (Auto)  3.65 H  (0.24-0.36)  K/mm3


 


Eos # (Auto)  0.00 L  (0.04-0.36)  K/mm3


 


Baso # (Auto)  0.02  (0.01-0.08)  K/mm3


 


Manual Slide Review  Abnormal smear  


 


RPR   (NONREACTIVE)  


 


Blood Type   


 


Gel Antibody Screen   


 


Fetal Screen   


 


RhIG Candidate?   


 


Rhogam Indicated   











Micro Results - Last 24 Hours: 


 Microbiology











 20 07:50 Respiratory Syncytial Virus Ag Scrn - Final





 Nasopharyngeal Swab    NEGATIVE RSV ANTIGEN





    REFERENCE RANGE: NEGATIVE


 


 20 07:50 Influenza Type A Antigen Screen - Final





 Nasopharyngeal Swab    NEGATIVE INFLUENZA A VIRUS AG





    REFERENCE RANGE: NEGATIVE





 Influenza Type B Antigen Screen - Final





    NEGATIVE INFLUENZA B VIRUS AG





    REFERENCE RANGE: NEGATIVE











Med Orders - Current: 


 Current Medications





Acetaminophen (Tylenol)  650 mg PO Q4H PRN


   PRN Reason: Pain


Benzocaine/Menthol (Dermoplast Pain Relief Spray)  0 gm TOP ASDIRECTED PRN


   PRN Reason: Perineal Comfort Measure


   Last Admin: 20 00:15 Dose:  1 can


Sodium Chloride (Normal Saline)  45 mls @ 40 mls/hr IV ASDIRECTED MURALI


   Last Admin: 20 08:46 Dose:  40 mls/hr


Sodium Chloride (Saline Flush)  10 ml FLUSH ONETIME PRN


   PRN Reason: Keep Vein Open


   Last Admin: 20 08:46 Dose:  10 ml


Witch Hazel (Tucks)  1 pad TOP ASDIRECTED PRN


   PRN Reason: Pain


   Last Admin: 20 00:15 Dose:  1 tub





Discontinued Medications





Acetaminophen (Tylenol)  650 mg PO Q4H PRN


   PRN Reason: Pain (Mild 1-3) and fever


Calcium Carbonate/Glycine (Tums)  1,000 mg PO Q2H PRN


   PRN Reason: Indigestion


Diphenhydramine HCl (Benadryl)  25 mg IVPUSH Q6H PRN


   PRN Reason: pruritis


Ephedrine Sulfate (Ephedrine Sulfate)  5 mg IVPUSH ASDIRECTED PRN


   PRN Reason: Hypotension


Fentanyl (Sublimaze)  100 mcg EPIDUR Q3H PRN


   PRN Reason: Pain


   Last Admin: 04/10/20 22:25 Dose:  50 mcg


Fentanyl/Bupivacaine HCl (Fentanyl/Bupivacaine/Ns 2 Mcg-0.125% 100 Ml)  100 ml 

EPIDUR ASDIRECTED PRN


   PRN Reason: Pain


   Last Admin: 04/10/20 14:55 Dose:  100 ml


Lactated Ringer's (Ringers, Lactated)  1,000 mls @ 100 mls/hr IV ASDIRECTED MURALI


   Last Infusion: 04/10/20 16:02 Dose:  100 mls/hr


Oxytocin/Lactated Ringer's (Pitocin In Lr 10 Units/1,000 Ml)  10 unit in 1,000 

mls @ 12 mls/hr IV TITRATE MURALI; Protocol


Oxytocin/Lactated Ringer's (Pitocin In Lr 10 Units/1,000 Ml)  10 unit in 1,000 

mls @ 100 mls/hr IV .CONTINUOUS MURALI


   Last Admin: 04/10/20 21:23 Dose:  100 mls/hr


Ceftriaxone Sodium 2 gm/ (Sodium Chloride)  100 mls @ 200 mls/hr IV ONETIME ONE


   Stop: 20 04:16


   Last Admin: 20 04:00 Dose:  Not Given


Ceftriaxone Sodium 2 gm/ (Sodium Chloride)  100 mls @ 200 mls/hr IV ONETIME ONE


   Stop: 20 04:27


   Last Admin: 20 04:18 Dose:  200 mls/hr


Ibuprofen (Motrin)  600 mg PO Q6H PRN


   PRN Reason: Mild pain or fever


Iopamidol (Isovue-370 (76%))  150 ml IARTIC ONETIME ONE


   Stop: 20 07:02


   Last Admin: 20 08:45 Dose:  150 ml


Misoprostol (Cytotec) Confirm Administered Dose 200 mcg .ROUTE .STK-MED ONE


   Stop: 04/10/20 21:27


   Last Admin: 04/10/20 22:20 Dose:  600 mcg


Nalbuphine HCl (Nubain)  10 mg IVPUSH Q2H PRN


   PRN Reason: Pain


   Last Admin: 04/10/20 12:01 Dose:  10 mg


Ondansetron HCl (Zofran)  4 mg IVPUSH Q4H PRN


   PRN Reason: Nausea/Vomiting


Sodium Chloride (Saline Flush)  10 ml FLUSH ASDIRECTED PRN


   PRN Reason: Keep Vein Open











- Infant Interaction


Infant Disposition, Postpartum:  to Nursery


Infant Feeding: Other (see below) (pumping)


Support Person: 





- Postpartum Recovery Exam


Fundal Tone: Firm


Fundal Level: At Umbilicus


Fundal Placement: Midline


Lochia Amount: Moderate


Lochia Color: Rubra/Red


Episiotomy/Laceration: Approximated


Bladder Status: Voiding


Urinary Elimination: Voided





- Exam


General: Alert, Oriented


HEENT: Pupils Equal


Neck: Supple


Lungs: Clear to Auscultation, Normal Respiratory Effort


Cardiovascular: Regular Rate, Regular Rhythm


GI/Abdominal Exam: Normal Bowel Sounds, Soft, Non-Tender, No Organomegaly, No 

Distention, No Abnormal Bruit, No Mass, Pelvis Stable


Extremities: Normal Inspection, Normal Range of Motion, No Pedal Edema, Normal 

Capillary Refill


Neurological: No New Focal Deficit


Psy/Mental Status: Alert, Normal Affect, Normal Mood





- Problem List Review


Problem List Initiated/Reviewed/Updated: Yes





- My Orders


Last 24 Hours: 


My Active Orders





04/10/20 23:58


Activity as Tolerated [RC] PER UNIT ROUTINE 


Vital Signs [RC] Q1HR 


Benzocaine/Menthol [Dermoplast Pain Relief Spray]   See Dose Instructions  TOP 

ASDIRECTED PRN 


witch hazeL [Tucks]   1 pad TOP ASDIRECTED PRN 


Assess Lochia [WOMSER] Per Unit Routine 


Assess Uterine Involution [WOMSER] Per Unit Routine 


Breast Pump [WOMSER] Per Unit Routine 


Heat Therapy [OM.PC] PRN 


Medication Administration Instruction [OM.PC] Routine 


Perineal Care [OM.PC] Per Unit Routine 


Sitz Bath [OM.PC] Per Unit Routine 





20 04:06


ANTIBODY IDENTIFICATION [BBK] Routine 


FETAL SCREEN [BBK] Routine 


RH IMMUNE GLOBULIN [BBK] Routine 


RHOGAM, POSTPARTUM [RHIG WORKUP, POSTPARTUM] [BBK] Routine 





20 06:48


CTA Chest W WO Contrast [Ang Chest] [CT] Stat 





20 07:24


Isolation [COMM] Routine 





20 07:35


Isolation [COMM] Routine 





20 07:50


CORONAVIRUS COVID-19 PCR PHL Stat 





20 10:00


Antiembolic Devices [RC] PER UNIT ROUTINE 


Pulse Oximetry Continuous Monitoring [OM.PC] Routine 


SCD [Sequential Compression Device] [OM.PC] Routine 





20 10:58


CBC WITH MANUAL DIFF [HEME] Routine 





20 10:59


COMPREHENSIVE METABOLIC PN,CMP [CHEM] Routine 


CRP [C-REACTIVE PROTEIN] [CHEM] Routine 


LACTATE DEHYDROGENASE,LDH [CHEM] Routine 





20 11:00


FERRITIN [CHEM] Routine 





20 11:07


PROCALCITONIN [REF] Routine 





20 11:16


Acetaminophen [Tylenol]   650 mg PO Q4H PRN 





20 23:58


Heat Therapy [OM.PC] PRN 





20 Lunch


Regular Diet [DIET] 














- Assessment


Assessment:: 





Doing reasonably well


- saturations 96-99 on room air now.


-afebrile


-labs pending.


-pain controlled and minimal lochia

## 2020-04-11 NOTE — PCM.PNPP
- General Info


Date of Service: 20


Subjective Update: 





25 year old [id number] s/p  with shoulder dystocia, postpartum hemorrhage and 

retained placental fragment. Now with significant shortness of breath, 

tachypnea and occasionally decreased oxygen saturations. Difficulty with 

activity. 


Functional Status: Reports: Pain Controlled





- Review of Systems


General: Reports: No Symptoms


HEENT: Reports: No Symptoms


Pulmonary: Reports: No Symptoms


Cardiovascular: Reports: No Symptoms


Gastrointestinal: Reports: No Symptoms


Genitourinary: Reports: No Symptoms


Musculoskeletal: Reports: No Symptoms


Skin: Reports: No Symptoms


Neurological: Reports: No Symptoms


Psychiatric: Reports: No Symptoms





- General Info


Date of Service: 20





- Patient Data


Vital Signs - Most Recent: 


 Last Vital Signs











Temp  37.7 C   20 03:41


 


Pulse  129 H  20 03:41


 


Resp  22 H  20 03:41


 


BP  116/88   20 03:41


 


Pulse Ox  94 L  20 03:41











Weight - Most Recent: 103.646 kg


I&O - Last 24 Hours: 


 Intake & Output











 04/10/20 04/11/20 04/11/20





 22:59 06:59 14:59


 


Intake Total 1000 1600 


 


Output Total 300  


 


Balance 700 1600 











Lab Results - Last 24 Hours: 


 Laboratory Results - last 24 hr











  04/10/20 04/10/20 04/11/20 Range/Units





  06:25 21:50 04:06 


 


WBC   27.80 H   (3.98-10.04)  K/mm3


 


RBC   3.88 L   (3.98-5.22)  M/mm3


 


Hgb   11.1 L   (11.2-15.7)  gm/dl


 


Hct   34.1   (34.1-44.9)  %


 


MCV   87.9   (79.4-94.8)  fl


 


MCH   28.6   (25.6-32.2)  pg


 


MCHC   32.6   (32.2-35.5)  g/dl


 


RDW Std Deviation   44.4   (36.4-46.3)  fL


 


Plt Count   139 L   (182-369)  K/mm3


 


MPV   13.3 H   (9.4-12.3)  fl


 


Neut % (Auto)   86.1 H   (34.0-71.1)  %


 


Lymph % (Auto)   3.8 L   (19.3-51.7)  %


 


Mono % (Auto)   9.6   (4.7-12.5)  %


 


Eos % (Auto)   0 L   (0.7-5.8)  


 


Baso % (Auto)   0.1   (0.1-1.2)  %


 


Neut # (Auto)   23.94 H   (1.56-6.13)  K/mm3


 


Lymph # (Auto)   1.06 L   (1.18-3.74)  K/mm3


 


Mono # (Auto)   2.67 H   (0.24-0.36)  K/mm3


 


Eos # (Auto)   0.00 L   (0.04-0.36)  K/mm3


 


Baso # (Auto)   0.02   (0.01-0.08)  K/mm3


 


Manual Slide Review   Abnormal smear   


 


RPR  Non-reactive    (NONREACTIVE)  


 


Blood Type    A NEGATIVE  


 


Gel Antibody Screen    Positive  


 


Fetal Screen    3 ros/5 flds - neg  


 


RhIG Candidate?    Yes  


 


Rhogam Indicated    Yes, baby rh pos H  














  20 Range/Units





  04:06 


 


WBC  28.19 H  (3.98-10.04)  K/mm3


 


RBC  3.31 L  (3.98-5.22)  M/mm3


 


Hgb  9.3 L D  (11.2-15.7)  gm/dl


 


Hct  29.0 L  (34.1-44.9)  %


 


MCV  87.6  (79.4-94.8)  fl


 


MCH  28.1  (25.6-32.2)  pg


 


MCHC  32.1 L  (32.2-35.5)  g/dl


 


RDW Std Deviation  43.2  (36.4-46.3)  fL


 


Plt Count  140 L  (182-369)  K/mm3


 


MPV  13.2 H  (9.4-12.3)  fl


 


Neut % (Auto)  80.8 H  (34.0-71.1)  %


 


Lymph % (Auto)  5.8 L  (19.3-51.7)  %


 


Mono % (Auto)  12.9 H  (4.7-12.5)  %


 


Eos % (Auto)  0 L  (0.7-5.8)  


 


Baso % (Auto)  0.1  (0.1-1.2)  %


 


Neut # (Auto)  22.77 H  (1.56-6.13)  K/mm3


 


Lymph # (Auto)  1.63  (1.18-3.74)  K/mm3


 


Mono # (Auto)  3.65 H  (0.24-0.36)  K/mm3


 


Eos # (Auto)  0.00 L  (0.04-0.36)  K/mm3


 


Baso # (Auto)  0.02  (0.01-0.08)  K/mm3


 


Manual Slide Review  Abnormal smear  


 


RPR   (NONREACTIVE)  


 


Blood Type   


 


Gel Antibody Screen   


 


Fetal Screen   


 


RhIG Candidate?   


 


Rhogam Indicated   











Med Orders - Current: 


 Current Medications





Benzocaine/Menthol (Dermoplast Pain Relief Spray)  0 gm TOP ASDIRECTED PRN


   PRN Reason: Perineal Comfort Measure


   Last Admin: 20 00:15 Dose:  1 can


Sodium Chloride (Normal Saline)  45 mls @ 40 mls/hr IV ASDIRECTED MURALI


Ibuprofen (Motrin)  600 mg PO Q6H PRN


   PRN Reason: Mild pain or fever


Sodium Chloride (Saline Flush)  10 ml FLUSH ONETIME PRN


   PRN Reason: Keep Vein Open


Witch Hazel (Tucks)  1 pad TOP ASDIRECTED PRN


   PRN Reason: Pain


   Last Admin: 20 00:15 Dose:  1 tub





Discontinued Medications





Acetaminophen (Tylenol)  650 mg PO Q4H PRN


   PRN Reason: Pain (Mild 1-3) and fever


Calcium Carbonate/Glycine (Tums)  1,000 mg PO Q2H PRN


   PRN Reason: Indigestion


Diphenhydramine HCl (Benadryl)  25 mg IVPUSH Q6H PRN


   PRN Reason: pruritis


Ephedrine Sulfate (Ephedrine Sulfate)  5 mg IVPUSH ASDIRECTED PRN


   PRN Reason: Hypotension


Fentanyl (Sublimaze)  100 mcg EPIDUR Q3H PRN


   PRN Reason: Pain


   Last Admin: 04/10/20 22:25 Dose:  50 mcg


Fentanyl/Bupivacaine HCl (Fentanyl/Bupivacaine/Ns 2 Mcg-0.125% 100 Ml)  100 ml 

EPIDUR ASDIRECTED PRN


   PRN Reason: Pain


   Last Admin: 04/10/20 14:55 Dose:  100 ml


Lactated Ringer's (Ringers, Lactated)  1,000 mls @ 100 mls/hr IV ASDIRECTED MURALI


   Last Infusion: 04/10/20 16:02 Dose:  100 mls/hr


Oxytocin/Lactated Ringer's (Pitocin In Lr 10 Units/1,000 Ml)  10 unit in 1,000 

mls @ 12 mls/hr IV TITRATE MURALI; Protocol


Oxytocin/Lactated Ringer's (Pitocin In Lr 10 Units/1,000 Ml)  10 unit in 1,000 

mls @ 100 mls/hr IV .CONTINUOUS MURALI


   Last Admin: 04/10/20 21:23 Dose:  100 mls/hr


Ceftriaxone Sodium 2 gm/ (Sodium Chloride)  100 mls @ 200 mls/hr IV ONETIME ONE


   Stop: 20 04:16


   Last Admin: 20 04:00 Dose:  Not Given


Ceftriaxone Sodium 2 gm/ (Sodium Chloride)  100 mls @ 200 mls/hr IV ONETIME ONE


   Stop: 20 04:27


   Last Admin: 20 04:18 Dose:  200 mls/hr


Iopamidol (Isovue-370 (76%))  150 ml IARTIC ONETIME ONE


   Stop: 20 07:02


Misoprostol (Cytotec) Confirm Administered Dose 200 mcg .ROUTE .STK-MED ONE


   Stop: 04/10/20 21:27


   Last Admin: 04/10/20 22:20 Dose:  600 mcg


Nalbuphine HCl (Nubain)  10 mg IVPUSH Q2H PRN


   PRN Reason: Pain


   Last Admin: 04/10/20 12:01 Dose:  10 mg


Ondansetron HCl (Zofran)  4 mg IVPUSH Q4H PRN


   PRN Reason: Nausea/Vomiting


Sodium Chloride (Saline Flush)  10 ml FLUSH ASDIRECTED PRN


   PRN Reason: Keep Vein Open











- Infant Interaction


Infant Disposition, Postpartum: [place] at Bedside (pending isolette to move to 

nursery)


Infant Interaction: Holding Infant


Infant Feeding:  Infant; Nursed Well


Support Person: 





- Postpartum Recovery Exam


Fundal Tone: Firm


Fundal Level: At Umbilicus


Fundal Placement: Midline


Lochia Amount: Moderate


Lochia Color: Rubra/Red


Episiotomy/Laceration: Approximated


Bladder Status: Voiding


Urinary Elimination: Voided





- Exam


Quality Assessment: No: Supplemental Oxygen


General: Alert, Oriented


HEENT: Pupils Equal, Pupils Reactive


Neck: Supple


Lungs: Clear to Auscultation, Other (somewhat tachypnic)


Cardiovascular: Regular Rate, Regular Rhythm


GI/Abdominal Exam: Normal Bowel Sounds, Soft, Non-Tender, No Organomegaly


Extremities: Normal Inspection, Normal Range of Motion, Non-Tender


Skin: Warm, Dry, Intact


Neurological: No New Focal Deficit


Psy/Mental Status: Alert, Normal Affect





- Problem List Review


Problem List Initiated/Reviewed/Updated: Yes





- My Orders


Last 24 Hours: 


My Active Orders





04/10/20 23:58


Activity as Tolerated [RC] PER UNIT ROUTINE 


Vital Signs [RC] 09,15,21,03 


Benzocaine/Menthol [Dermoplast Pain Relief Spray]   See Dose Instructions  TOP 

ASDIRECTED PRN 


Ibuprofen [Motrin]   600 mg PO Q6H PRN 


witch hazeL [Tucks]   1 pad TOP ASDIRECTED PRN 


Assess Lochia [WOMSER] Per Unit Routine 


Assess Uterine Involution [WOMSER] Per Unit Routine 


Breast Pump [WOMSER] Per Unit Routine 


Heat Therapy [OM.PC] PRN 


Medication Administration Instruction [OM.PC] Routine 


Perineal Care [OM.PC] Per Unit Routine 


Sitz Bath [OM.PC] Per Unit Routine 





20 04:06


ANTIBODY IDENTIFICATION [BBK] Routine 


FETAL SCREEN [BBK] Routine 


RH IMMUNE GLOBULIN [BBK] Routine 


RHOGAM, POSTPARTUM [RHIG WORKUP, POSTPARTUM] [BBK] Routine 





20 05:01


PATIENT RETYPE [BBK] Routine 





20 06:48


CTA Chest W WO Contrast [Ang Chest] [CT] Stat 





20 06:50


CORONAVIRUS COVID-19 PCR PHL Stat 





20 23:58


Heat Therapy [OM.PC] PRN 














- Assessment


Assessment:: 





PPD1 with worsening shortness of breath, slight anemia and tachypnea. 


-CT angio to evaluate for pulmonary edema and PE


-Covid-19 testing


-q6 labs - cbc and cmp 


- family notified of covid testing and ramifications with infant care

## 2020-04-11 NOTE — PCM.PNPP
- General Info


Date of Service: 20


Functional Status: Reports: Pain Controlled, Other (moderate lochia, shortness 

of breath with speech and exertion)





- Review of Systems


General: Denies: Fever, Weakness, Night Sweats


HEENT: Reports: No Symptoms


Pulmonary: Reports: No Symptoms


Cardiovascular: Reports: No Symptoms


Gastrointestinal: Reports: No Symptoms.  Denies: Constipation, Diarrhea, Nausea

, Vomiting


Genitourinary: Reports: No Symptoms


Musculoskeletal: Reports: No Symptoms


Skin: Reports: No Symptoms


Neurological: Reports: No Symptoms


Psychiatric: Reports: No Symptoms





- General Info


Date of Service: 20





- Patient Data


Vital Signs - Most Recent: 


 Last Vital Signs











Temp  36.5 C   20 16:30


 


Pulse  89   20 16:31


 


Resp  32 H  20 16:30


 


BP  93/54 L  20 16:31


 


Pulse Ox  98   20 16:31











Weight - Most Recent: 103.646 kg


I&O - Last 24 Hours: 


 Intake & Output











 20





 06:59 14:59 22:59


 


Intake Total 1600 121 800


 


Output Total   550


 


Balance 1600 121 250











Lab Results - Last 24 Hours: 


 Laboratory Results - last 24 hr











  04/10/20 04/10/20 04/11/20 Range/Units





  06:25 21:50 04:06 


 


WBC   27.80 H   (3.98-10.04)  K/mm3


 


RBC   3.88 L   (3.98-5.22)  M/mm3


 


Hgb   11.1 L   (11.2-15.7)  gm/dl


 


Hct   34.1   (34.1-44.9)  %


 


MCV   87.9   (79.4-94.8)  fl


 


MCH   28.6   (25.6-32.2)  pg


 


MCHC   32.6   (32.2-35.5)  g/dl


 


RDW Std Deviation   44.4   (36.4-46.3)  fL


 


Plt Count   139 L   (182-369)  K/mm3


 


MPV   13.3 H   (9.4-12.3)  fl


 


Neut % (Auto)   86.1 H   (34.0-71.1)  %


 


Lymph % (Auto)   3.8 L   (19.3-51.7)  %


 


Mono % (Auto)   9.6   (4.7-12.5)  %


 


Eos % (Auto)   0 L   (0.7-5.8)  


 


Baso % (Auto)   0.1   (0.1-1.2)  %


 


Neut # (Auto)   23.94 H   (1.56-6.13)  K/mm3


 


Lymph # (Auto)   1.06 L   (1.18-3.74)  K/mm3


 


Mono # (Auto)   2.67 H   (0.24-0.36)  K/mm3


 


Eos # (Auto)   0.00 L   (0.04-0.36)  K/mm3


 


Baso # (Auto)   0.02   (0.01-0.08)  K/mm3


 


Neutrophils % (Manual)     (40-60)  %


 


Band Neutrophils %     (0-10)  %


 


Lymphocytes % (Manual)     (20-40)  %


 


Atypical Lymphs %     %


 


Monocytes % (Manual)     (2-10)  %


 


Eosinophils % (Manual)     (0.7-5.8)  %


 


Basophils % (Manual)     (0.1-1.2)  


 


Manual Slide Review   Abnormal smear   


 


Toxic Granulation     


 


Platelet Estimate     


 


Plt Morphology Comment     


 


Hypochromasia     


 


Anisocytosis     


 


Microcytosis     


 


RBC Morph Comment     


 


Sodium     (136-145)  mEq/L


 


Potassium     (3.5-5.1)  mEq/L


 


Chloride     ()  mEq/L


 


Carbon Dioxide     (21-32)  mEq/L


 


Anion Gap     (5-15)  


 


BUN     (7-18)  mg/dL


 


Creatinine     (0.55-1.02)  mg/dL


 


Est Cr Clr Drug Dosing     mL/min


 


Estimated GFR (MDRD)     (>60)  mL/min


 


BUN/Creatinine Ratio     (14-18)  


 


Glucose     ()  mg/dL


 


Calcium     (8.5-10.1)  mg/dL


 


Ferritin     (8-252)  ng/ml


 


Total Bilirubin     (0.2-1.0)  mg/dL


 


AST     (15-37)  U/L


 


ALT     (14-59)  U/L


 


Alkaline Phosphatase     ()  U/L


 


Lactate Dehydrogenase     ()  U/L


 


C-Reactive Protein     (<1.0)  mg/dL


 


Total Protein     (6.4-8.2)  g/dl


 


Albumin     (3.4-5.0)  g/dl


 


Globulin     gm/dL


 


Albumin/Globulin Ratio     (1-2)  


 


RPR  Non-reactive    (NONREACTIVE)  


 


Blood Type    A NEGATIVE  


 


Gel Antibody Screen    Positive  


 


Fetal Screen    3 ros/5 flds - neg  


 


RhIG Candidate?    Yes  


 


Rhogam Indicated    Yes, baby rh pos H  














  20 Range/Units





  04:06 11:30 11:30 


 


WBC  28.19 H  23.23 H   (3.98-10.04)  K/mm3


 


RBC  3.31 L  3.16 L   (3.98-5.22)  M/mm3


 


Hgb  9.3 L D  9.1 L   (11.2-15.7)  gm/dl


 


Hct  29.0 L  27.9 L   (34.1-44.9)  %


 


MCV  87.6  88.3   (79.4-94.8)  fl


 


MCH  28.1  28.8   (25.6-32.2)  pg


 


MCHC  32.1 L  32.6   (32.2-35.5)  g/dl


 


RDW Std Deviation  43.2  44.7   (36.4-46.3)  fL


 


Plt Count  140 L  132 L   (182-369)  K/mm3


 


MPV  13.2 H  12.9 H   (9.4-12.3)  fl


 


Neut % (Auto)  80.8 H    (34.0-71.1)  %


 


Lymph % (Auto)  5.8 L    (19.3-51.7)  %


 


Mono % (Auto)  12.9 H    (4.7-12.5)  %


 


Eos % (Auto)  0 L    (0.7-5.8)  


 


Baso % (Auto)  0.1    (0.1-1.2)  %


 


Neut # (Auto)  22.77 H    (1.56-6.13)  K/mm3


 


Lymph # (Auto)  1.63    (1.18-3.74)  K/mm3


 


Mono # (Auto)  3.65 H    (0.24-0.36)  K/mm3


 


Eos # (Auto)  0.00 L    (0.04-0.36)  K/mm3


 


Baso # (Auto)  0.02    (0.01-0.08)  K/mm3


 


Neutrophils % (Manual)   83 H   (40-60)  %


 


Band Neutrophils %   0   (0-10)  %


 


Lymphocytes % (Manual)   9 L   (20-40)  %


 


Atypical Lymphs %   0   %


 


Monocytes % (Manual)   8   (2-10)  %


 


Eosinophils % (Manual)   0 L   (0.7-5.8)  %


 


Basophils % (Manual)   0 L   (0.1-1.2)  


 


Manual Slide Review  Abnormal smear    


 


Toxic Granulation     


 


Platelet Estimate   Adequate   


 


Plt Morphology Comment   See note   


 


Hypochromasia   2+ moderate   


 


Anisocytosis   1+ slight   


 


Microcytosis   2+ moderate   


 


RBC Morph Comment   Abnormal   


 


Sodium    138  (136-145)  mEq/L


 


Potassium    3.7  (3.5-5.1)  mEq/L


 


Chloride    106  ()  mEq/L


 


Carbon Dioxide    22  (21-32)  mEq/L


 


Anion Gap    13.7  (5-15)  


 


BUN    8  (7-18)  mg/dL


 


Creatinine    0.8  (0.55-1.02)  mg/dL


 


Est Cr Clr Drug Dosing    99.76  mL/min


 


Estimated GFR (MDRD)    > 60  (>60)  mL/min


 


BUN/Creatinine Ratio    10.0 L  (14-18)  


 


Glucose    118 H  ()  mg/dL


 


Calcium    8.3 L  (8.5-10.1)  mg/dL


 


Ferritin     (8-252)  ng/ml


 


Total Bilirubin    0.6  (0.2-1.0)  mg/dL


 


AST    24  (15-37)  U/L


 


ALT    16  (14-59)  U/L


 


Alkaline Phosphatase    137 H  ()  U/L


 


Lactate Dehydrogenase    246 H  ()  U/L


 


C-Reactive Protein    10.4 H*  (<1.0)  mg/dL


 


Total Protein    5.1 L  (6.4-8.2)  g/dl


 


Albumin    2.0 L  (3.4-5.0)  g/dl


 


Globulin    3.1  gm/dL


 


Albumin/Globulin Ratio    0.7 L  (1-2)  


 


RPR     (NONREACTIVE)  


 


Blood Type     


 


Gel Antibody Screen     


 


Fetal Screen     


 


RhIG Candidate?     


 


Rhogam Indicated     














  20 Range/Units





  11:30 17:12 17:12 


 


WBC   21.54 H   (3.98-10.04)  K/mm3


 


RBC   3.00 L   (3.98-5.22)  M/mm3


 


Hgb   8.5 L   (11.2-15.7)  gm/dl


 


Hct   26.6 L   (34.1-44.9)  %


 


MCV   88.7   (79.4-94.8)  fl


 


MCH   28.3   (25.6-32.2)  pg


 


MCHC   32.0 L   (32.2-35.5)  g/dl


 


RDW Std Deviation   44.6   (36.4-46.3)  fL


 


Plt Count   132 L   (182-369)  K/mm3


 


MPV   12.9 H   (9.4-12.3)  fl


 


Neut % (Auto)     (34.0-71.1)  %


 


Lymph % (Auto)     (19.3-51.7)  %


 


Mono % (Auto)     (4.7-12.5)  %


 


Eos % (Auto)     (0.7-5.8)  


 


Baso % (Auto)     (0.1-1.2)  %


 


Neut # (Auto)     (1.56-6.13)  K/mm3


 


Lymph # (Auto)     (1.18-3.74)  K/mm3


 


Mono # (Auto)     (0.24-0.36)  K/mm3


 


Eos # (Auto)     (0.04-0.36)  K/mm3


 


Baso # (Auto)     (0.01-0.08)  K/mm3


 


Neutrophils % (Manual)   74 H   (40-60)  %


 


Band Neutrophils %   0   (0-10)  %


 


Lymphocytes % (Manual)   17 L   (20-40)  %


 


Atypical Lymphs %   0   %


 


Monocytes % (Manual)   9   (2-10)  %


 


Eosinophils % (Manual)   0 L   (0.7-5.8)  %


 


Basophils % (Manual)   0 L   (0.1-1.2)  


 


Manual Slide Review     


 


Toxic Granulation   2+ moderate   


 


Platelet Estimate   Adequate   


 


Plt Morphology Comment   Normal   


 


Hypochromasia   2+ moderate   


 


Anisocytosis   2+ moderate   


 


Microcytosis     


 


RBC Morph Comment   Not Reportable   


 


Sodium    138  (136-145)  mEq/L


 


Potassium    4.0  (3.5-5.1)  mEq/L


 


Chloride    107  ()  mEq/L


 


Carbon Dioxide    22  (21-32)  mEq/L


 


Anion Gap    13.0  (5-15)  


 


BUN    9  (7-18)  mg/dL


 


Creatinine    0.7  (0.55-1.02)  mg/dL


 


Est Cr Clr Drug Dosing    114.01  mL/min


 


Estimated GFR (MDRD)    > 60  (>60)  mL/min


 


BUN/Creatinine Ratio    12.9 L  (14-18)  


 


Glucose    81  ()  mg/dL


 


Calcium    8.3 L  (8.5-10.1)  mg/dL


 


Ferritin  25    (8-252)  ng/ml


 


Total Bilirubin    0.3  (0.2-1.0)  mg/dL


 


AST    27  (15-37)  U/L


 


ALT    16  (14-59)  U/L


 


Alkaline Phosphatase    127 H  ()  U/L


 


Lactate Dehydrogenase     ()  U/L


 


C-Reactive Protein     (<1.0)  mg/dL


 


Total Protein    5.0 L  (6.4-8.2)  g/dl


 


Albumin    1.9 L  (3.4-5.0)  g/dl


 


Globulin    3.1  gm/dL


 


Albumin/Globulin Ratio    0.6 L  (1-2)  


 


RPR     (NONREACTIVE)  


 


Blood Type     


 


Gel Antibody Screen     


 


Fetal Screen     


 


RhIG Candidate?     


 


Rhogam Indicated     











Micro Results - Last 24 Hours: 


 Microbiology











 20 07:50 Respiratory Syncytial Virus Ag Scrn - Final





 Nasopharyngeal Swab    NEGATIVE RSV ANTIGEN





    REFERENCE RANGE: NEGATIVE


 


 20 07:50 Influenza Type A Antigen Screen - Final





 Nasopharyngeal Swab    NEGATIVE INFLUENZA A VIRUS AG





    REFERENCE RANGE: NEGATIVE





 Influenza Type B Antigen Screen - Final





    NEGATIVE INFLUENZA B VIRUS AG





    REFERENCE RANGE: NEGATIVE











Med Orders - Current: 


 Current Medications





Acetaminophen (Tylenol)  650 mg PO Q4H PRN


   PRN Reason: Pain


   Last Admin: 20 17:42 Dose:  650 mg


Benzocaine/Menthol (Dermoplast Pain Relief Spray)  0 gm TOP ASDIRECTED PRN


   PRN Reason: Perineal Comfort Measure


   Last Admin: 20 00:15 Dose:  1 can


Sodium Chloride (Normal Saline)  45 mls @ 40 mls/hr IV ASDIRECTED MURALI


   Last Admin: 20 08:46 Dose:  40 mls/hr


Sodium Chloride (Saline Flush)  10 ml FLUSH ONETIME PRN


   PRN Reason: Keep Vein Open


   Last Admin: 20 08:46 Dose:  10 ml


Witch Hazel (Tucks)  1 pad TOP ASDIRECTED PRN


   PRN Reason: Pain


   Last Admin: 20 00:15 Dose:  1 tub





Discontinued Medications





Acetaminophen (Tylenol)  650 mg PO Q4H PRN


   PRN Reason: Pain (Mild 1-3) and fever


Calcium Carbonate/Glycine (Tums)  1,000 mg PO Q2H PRN


   PRN Reason: Indigestion


Diphenhydramine HCl (Benadryl)  25 mg IVPUSH Q6H PRN


   PRN Reason: pruritis


Ephedrine Sulfate (Ephedrine Sulfate)  5 mg IVPUSH ASDIRECTED PRN


   PRN Reason: Hypotension


Fentanyl (Sublimaze)  100 mcg EPIDUR Q3H PRN


   PRN Reason: Pain


   Last Admin: 04/10/20 22:25 Dose:  50 mcg


Fentanyl/Bupivacaine HCl (Fentanyl/Bupivacaine/Ns 2 Mcg-0.125% 100 Ml)  100 ml 

EPIDUR ASDIRECTED PRN


   PRN Reason: Pain


   Last Admin: 04/10/20 14:55 Dose:  100 ml


Lactated Ringer's (Ringers, Lactated)  1,000 mls @ 100 mls/hr IV ASDIRECTED MURALI


   Last Infusion: 04/10/20 16:02 Dose:  100 mls/hr


Oxytocin/Lactated Ringer's (Pitocin In Lr 10 Units/1,000 Ml)  10 unit in 1,000 

mls @ 12 mls/hr IV TITRATE MURALI; Protocol


Oxytocin/Lactated Ringer's (Pitocin In Lr 10 Units/1,000 Ml)  10 unit in 1,000 

mls @ 100 mls/hr IV .CONTINUOUS MURALI


   Last Admin: 04/10/20 21:23 Dose:  100 mls/hr


Ceftriaxone Sodium 2 gm/ (Sodium Chloride)  100 mls @ 200 mls/hr IV ONETIME ONE


   Stop: 20 04:16


   Last Admin: 20 04:00 Dose:  Not Given


Ceftriaxone Sodium 2 gm/ (Sodium Chloride)  100 mls @ 200 mls/hr IV ONETIME ONE


   Stop: 20 04:27


   Last Admin: 20 04:18 Dose:  200 mls/hr


Ibuprofen (Motrin)  600 mg PO Q6H PRN


   PRN Reason: Mild pain or fever


Iopamidol (Isovue-370 (76%))  150 ml IARTIC ONETIME ONE


   Stop: 20 07:02


   Last Admin: 20 08:45 Dose:  150 ml


Misoprostol (Cytotec) Confirm Administered Dose 200 mcg .ROUTE .STK-MED ONE


   Stop: 04/10/20 21:27


   Last Admin: 04/10/20 22:20 Dose:  600 mcg


Nalbuphine HCl (Nubain)  10 mg IVPUSH Q2H PRN


   PRN Reason: Pain


   Last Admin: 04/10/20 12:01 Dose:  10 mg


Ondansetron HCl (Zofran)  4 mg IVPUSH Q4H PRN


   PRN Reason: Nausea/Vomiting


Sodium Chloride (Saline Flush)  10 ml FLUSH ASDIRECTED PRN


   PRN Reason: Keep Vein Open











- Infant Interaction


Infant Disposition, Postpartum: [place] to Nursery


Infant Feeding: Other (see below) (pumping)


Support Person: 





- Postpartum Recovery Exam


Fundal Tone: Firm


Fundal Level: At Umbilicus


Fundal Placement: Midline


Lochia Amount: Small, Moderate


Lochia Color: Rubra/Red


Episiotomy/Laceration: Approximated


Bladder Status: Voiding


Urinary Elimination: Voided





- Exam


General: Alert, Oriented


HEENT: Pupils Equal


Neck: Supple


Lungs: Clear to Auscultation, Other (tachypnea)


Cardiovascular: Regular Rate, Regular Rhythm


GI/Abdominal Exam: Normal Bowel Sounds, Soft, Non-Tender, No Organomegaly, No 

Distention, No Abnormal Bruit, No Mass, Pelvis Stable


Extremities: Normal Inspection, Normal Range of Motion, Non-Tender, No Pedal 

Edema, Normal Capillary Refill, Other (scds on)


Skin: Warm, Dry, Intact


Wound/Incisions: Healing Well


Neurological: No New Focal Deficit


Psy/Mental Status: Alert, Normal Affect, Normal Mood





- Problem List Review


Problem List Initiated/Reviewed/Updated: Yes





- My Orders


Last 24 Hours: 


My Active Orders





04/10/20 23:58


Activity as Tolerated [RC] PER UNIT ROUTINE 


Vital Signs [RC] Q2HR 


Benzocaine/Menthol [Dermoplast Pain Relief Spray]   See Dose Instructions  TOP 

ASDIRECTED PRN 


witch hazeL [Tucks]   1 pad TOP ASDIRECTED PRN 


Assess Lochia [WOMSER] Per Unit Routine 


Assess Uterine Involution [WOMSER] Per Unit Routine 


Breast Pump [WOMSER] Per Unit Routine 


Heat Therapy [OM.PC] PRN 


Medication Administration Instruction [OM.PC] Routine 


Perineal Care [OM.PC] Per Unit Routine 


Sitz Bath [OM.PC] Per Unit Routine 





20 04:06


ANTIBODY IDENTIFICATION [BBK] Routine 


FETAL SCREEN [BBK] Routine 


RH IMMUNE GLOBULIN [BBK] Routine 


RHOGAM, POSTPARTUM [RHIG WORKUP, POSTPARTUM] [BBK] Routine 





20 06:48


CTA Chest W WO Contrast [Ang Chest] [CT] Stat 





20 07:24


Isolation [COMM] Routine 





20 07:35


Isolation [COMM] Routine 





20 07:50


CORONAVIRUS COVID-19 PCR PHL Stat 





20 10:00


Antiembolic Devices [RC] PER UNIT ROUTINE 


Pulse Oximetry Continuous Monitoring [OM.PC] Routine 


SCD [Sequential Compression Device] [OM.PC] Routine 





20 11:16


Acetaminophen [Tylenol]   650 mg PO Q4H PRN 





20 11:30


PROCALCITONIN [REF] Routine 





20 23:55


CBC WITH MANUAL DIFF [HEME] Routine 





20 23:58


Heat Therapy [OM.PC] PRN 





20 Lunch


Regular Diet [DIET] 





20 05:11


CXR [Chest 1V Frontal] [CR] AM 


CBC WITH MANUAL DIFF [HEME] AM 


CMP [COMPREHENSIVE METABOLIC PN,CMP] [CHEM] AM 





20 05:11


CBC WITH MANUAL DIFF [HEME] AM 


CMP [COMPREHENSIVE METABOLIC PN,CMP] [CHEM] AM 





04/15/20 05:11


CMP [COMPREHENSIVE METABOLIC PN,CMP] [CHEM] AM 














- Assessment


Assessment:: 





Doing reasonably well


- saturations 96-99 on room air now.


-afebrile


-labs pending for morning. Worsening anemia by a small amount. Recheck again in 

AM


-pain controlled and minimal lochia

## 2020-04-12 PROCEDURE — 30233N1 TRANSFUSION OF NONAUTOLOGOUS RED BLOOD CELLS INTO PERIPHERAL VEIN, PERCUTANEOUS APPROACH: ICD-10-PCS | Performed by: OBSTETRICS & GYNECOLOGY

## 2020-04-12 RX ADMIN — Medication PRN CAN: at 17:34

## 2020-04-12 RX ADMIN — WITCH HAZEL PRN TUB: 500 SOLUTION RECTAL; TOPICAL at 17:35

## 2020-04-12 NOTE — CR
Chest: Portable view of the chest was obtained.

 

Comparison: No previous chest imaging is available.

 

Heart size and mediastinum are normal.  Lungs show no acute 

parenchymal change.  Bony structures are grossly intact.

 

Impression:

1.  Nothing acute is identified on portable chest x-ray.

 

Diagnostic code #1

 

Study was dictated in MDT

## 2020-04-12 NOTE — CT
CT chest

 

Technique: Multiple axial sections were obtained from above the lung 

apices inferiorly through the lung bases.  Intravenous contrast was 

utilized.  Study performed as a pulmonary angiogram protocol.

 

Findings: Pulmonary arteries are well-opacified.  No filling defects 

are identified to indicate pulmonary embolism.  Aorta shows no 

aneurysm.  Mediastinum and hilar regions show no adenopathy.  No 

pericardial thickening is seen.  Visualized portions of the upper 

abdomen show small calcified gallstones partially visualized within 

the gallbladder.  No other acute finding is appreciated within the 

upper abdomen.  No axillary adenopathy is appreciated.  Lungs show no 

acute parenchymal change.  No pleural effusions are seen.

 

Bone window settings were reviewed which shows no acute osseous 

finding.

 

Impression:

1.  No findings of pulmonary embolism.

2.  Small partially visualized gallstones within the gallbladder.

3.  No acute finding is otherwise seen on CT study of the chest.

 

Diagnostic code #2

 

Study was dictated in MDT

## 2020-04-12 NOTE — [id number]
**Discharge Summary





- Hospital Course


Free Text/Narrative:: 





Admitted for labor. Progressed to [facility] with shoulder dystocia and postpartum 

hemorrhage. 





Postpartum day zero began having significant tachypnea, oxygen desaturations 

and shortness of breath with activity coupled with low grade elevated temps 

times multiple checks to 99.9. 





CT scan at that time showed ground glass opacities. WBC increased and 

lymphocytes decreased although absolute lymphocyte count greater than 1200.





Significant tachpnea and SOB persisted throughout morning on PPD2. Hgb also 

decreased and given pending covid and symptoms transfused 1 unit pRBC.





Feeling better and desired discharge home after negative test. Repeat pending 

at suggestion of lab and state given false negative rate and CT findings.


Diagnosis: Stroke: No





- Discharge Data


Discharge Date: 04/12/20


Discharge Disposition: Home, Self-Care 01


Condition: Good





- Referral to Home Health


Primary Care Physician: 


Isidra Robles MD








- Patient Instructions


Diet: Usual Diet as Tolerated


Activity: Apply Ice, No Strenuous Activities


Activity, Other: pelvic rest


Driving: Do Not Drive


Showering/Bathing: May Shower


Notify Provider of: Fever, Increased Pain, Swelling and Redness, Drainage, 

Nausea and/or Vomiting


Other/Special Instructions: Check temp bid. Quarantine until test returns.





- Discharge Plan


*PRESCRIPTION DRUG MONITORING PROGRAM REVIEWED*: No


*COPY OF PRESCRIPTION DRUG MONITORING REPORT IN PATIENT TERRELL: No


Home Medications: 


 Home Meds





Meclizine [Antivert] 12.5 mg PO TID PRN #21 tab 08/31/19 [Rx]


Ondansetron [Zofran ODT] 4 mg PO ASDIRECTED PRN #20 tab.dis 08/31/19 [Rx]


Calcium Carbonate [Tums] 500 mg PO 04/10/20 [History]








Referrals: 


Isidra Robles MD [Primary Care Provider] -  (6 weeks)


Zena Mckinnon MD [Physician] -  (2-4 days, telemedicine)





- Discharge Summary/Plan Comment


DC Time >30 min.: Yes (education, testing and discussion of home care)





- General Info


Date of Service: 04/12/20


Functional Status: Reports: Pain Controlled





- Review of Systems


General: Reports: No Symptoms


HEENT: Reports: No Symptoms


Pulmonary: Reports: No Symptoms


Cardiovascular: Reports: No Symptoms


Gastrointestinal: Reports: No Symptoms


Genitourinary: Reports: No Symptoms


Musculoskeletal: Reports: No Symptoms


Skin: Reports: No Symptoms


Neurological: Reports: No Symptoms


Psychiatric: Reports: No Symptoms





- Patient Data


Vitals - Most Recent: 


 Last Vital Signs











Temp  36.4 C   04/12/20 17:41


 


Pulse  82   04/12/20 17:41


 


Resp  22 H  04/12/20 17:37


 


BP  112/62   04/12/20 17:37


 


Pulse Ox  99   04/12/20 17:41











Weight - Most Recent: 103.646 kg


I&O - Last 24 hours: 


 Intake & Output











 04/12/20 04/12/20 04/12/20





 06:59 14:59 22:59


 


Intake Total 


 


Output Total 300 1600 500


 


Balance 500 -1075 580











Lab Results - Last 24 hrs: 


 Laboratory Results - last 24 hr











  04/11/20 04/11/20 04/11/20 Range/Units





  04:06 07:50 11:30 


 


WBC     (3.98-10.04)  K/mm3


 


RBC     (3.98-5.22)  M/mm3


 


Hgb     (11.2-15.7)  gm/dl


 


Hct     (34.1-44.9)  %


 


MCV     (79.4-94.8)  fl


 


MCH     (25.6-32.2)  pg


 


MCHC     (32.2-35.5)  g/dl


 


RDW Std Deviation     (36.4-46.3)  fL


 


Plt Count     (182-369)  K/mm3


 


MPV     (9.4-12.3)  fl


 


Neutrophils % (Manual)     (40-60)  %


 


Band Neutrophils %     (0-10)  %


 


Lymphocytes % (Manual)     (20-40)  %


 


Atypical Lymphs %     %


 


Monocytes % (Manual)     (2-10)  %


 


Eosinophils % (Manual)     (0.7-5.8)  %


 


Basophils % (Manual)     (0.1-1.2)  


 


Toxic Granulation     


 


Platelet Estimate     


 


Plt Morphology Comment     


 


Hypochromasia     


 


Anisocytosis     


 


Microcytosis     


 


Ovalocytes     


 


RBC Morph Comment     


 


Sodium     (136-145)  mEq/L


 


Potassium     (3.5-5.1)  mEq/L


 


Chloride     ()  mEq/L


 


Carbon Dioxide     (21-32)  mEq/L


 


Anion Gap     (5-15)  


 


BUN     (7-18)  mg/dL


 


Creatinine     (0.55-1.02)  mg/dL


 


Est Cr Clr Drug Dosing     mL/min


 


Estimated GFR (MDRD)     (>60)  mL/min


 


BUN/Creatinine Ratio     (14-18)  


 


Glucose     ()  mg/dL


 


Calcium     (8.5-10.1)  mg/dL


 


Total Bilirubin     (0.2-1.0)  mg/dL


 


AST     (15-37)  U/L


 


ALT     (14-59)  U/L


 


Alkaline Phosphatase     ()  U/L


 


Total Protein     (6.4-8.2)  g/dl


 


Albumin     (3.4-5.0)  g/dl


 


Globulin     gm/dL


 


Albumin/Globulin Ratio     (1-2)  


 


Procalcitonin    0.28 H  (<0.10)  ng/mL


 


COVID-19 PCR   Not detected   (NOT DETECT)  


 


Blood Type  A NEGATIVE    


 


Gel Antibody Screen  Positive    


 


Antibody Identification  Anti-D    


 


Fetal Screen  3 ros/5 flds - neg    


 


RhIG Candidate?  Yes    


 


Rhogam Indicated  Yes, baby rh pos H    


 


Crossmatch  See Detail    














  04/12/20 04/12/20 04/12/20 Range/Units





  00:29 08:30 08:30 


 


WBC  18.98 H  16.92 H   (3.98-10.04)  K/mm3


 


RBC  2.77 L  3.23 L   (3.98-5.22)  M/mm3


 


Hgb  7.7 L  9.2 L D   (11.2-15.7)  gm/dl


 


Hct  24.7 L  29.1 L   (34.1-44.9)  %


 


MCV  89.2  90.1   (79.4-94.8)  fl


 


MCH  27.8  28.5   (25.6-32.2)  pg


 


MCHC  31.2 L  31.6 L   (32.2-35.5)  g/dl


 


RDW Std Deviation  44.8  45.7   (36.4-46.3)  fL


 


Plt Count  123 L  121 L   (182-369)  K/mm3


 


MPV  12.3  12.9 H   (9.4-12.3)  fl


 


Neutrophils % (Manual)  67 H  77 H   (40-60)  %


 


Band Neutrophils %  7  0   (0-10)  %


 


Lymphocytes % (Manual)  17 L  13 L   (20-40)  %


 


Atypical Lymphs %  0  0   %


 


Monocytes % (Manual)  9  9   (2-10)  %


 


Eosinophils % (Manual)  0 L  1   (0.7-5.8)  %


 


Basophils % (Manual)  0 L  0 L   (0.1-1.2)  


 


Toxic Granulation  2+ moderate    


 


Platelet Estimate  Decreased  Adequate   


 


Plt Morphology Comment  See note    


 


Hypochromasia  1+ slight    


 


Anisocytosis   3+ marked   


 


Microcytosis   3+ marked   


 


Ovalocytes  2+ moderate    


 


RBC Morph Comment  Not Reportable  Abnormal   


 


Sodium    138  (136-145)  mEq/L


 


Potassium    4.0  (3.5-5.1)  mEq/L


 


Chloride    107  ()  mEq/L


 


Carbon Dioxide    23  (21-32)  mEq/L


 


Anion Gap    12.0  (5-15)  


 


BUN    8  (7-18)  mg/dL


 


Creatinine    0.7  (0.55-1.02)  mg/dL


 


Est Cr Clr Drug Dosing    114.01  mL/min


 


Estimated GFR (MDRD)    > 60  (>60)  mL/min


 


BUN/Creatinine Ratio    11.4 L  (14-18)  


 


Glucose    79  ()  mg/dL


 


Calcium    7.9 L  (8.5-10.1)  mg/dL


 


Total Bilirubin    0.3  (0.2-1.0)  mg/dL


 


AST    27  (15-37)  U/L


 


ALT    18  (14-59)  U/L


 


Alkaline Phosphatase    109  ()  U/L


 


Total Protein    5.1 L  (6.4-8.2)  g/dl


 


Albumin    2.1 L  (3.4-5.0)  g/dl


 


Globulin    3.0  gm/dL


 


Albumin/Globulin Ratio    0.7 L  (1-2)  


 


Procalcitonin     (<0.10)  ng/mL


 


COVID-19 PCR     (NOT DETECT)  


 


Blood Type     


 


Gel Antibody Screen     


 


Antibody Identification     


 


Fetal Screen     


 


RhIG Candidate?     


 


Rhogam Indicated     


 


Crossmatch     











Med Orders - Current: 


 Current Medications





Acetaminophen (Tylenol)  650 mg PO Q4H PRN


   PRN Reason: Pain


   Last Admin: 04/12/20 17:34 Dose:  650 mg


Benzocaine/Menthol (Dermoplast Pain Relief Spray)  0 gm TOP ASDIRECTED PRN


   PRN Reason: Perineal Comfort Measure


   Last Admin: 04/12/20 17:34 Dose:  1 can


Sodium Chloride (Normal Saline)  45 mls @ 40 mls/hr IV ASDIRECTED MURALI


   Last Admin: 04/11/20 08:46 Dose:  40 mls/hr


Sodium Chloride (Normal Saline)  250 mls @ 50 mls/hr IV ASDIRECTED MURALI


   Last Admin: 04/12/20 04:10 Dose:  50 mls/hr


Sodium Chloride (Saline Flush)  10 ml FLUSH ONETIME PRN


   PRN Reason: Keep Vein Open


   Last Admin: 04/11/20 08:46 Dose:  10 ml


Witch Hazel (Tucks)  1 pad TOP ASDIRECTED PRN


   PRN Reason: Pain


   Last Admin: 04/12/20 17:35 Dose:  1 tub





Discontinued Medications





Acetaminophen (Tylenol)  650 mg PO Q4H PRN


   PRN Reason: Pain (Mild 1-3) and fever


Calcium Carbonate/Glycine (Tums)  1,000 mg PO Q2H PRN


   PRN Reason: Indigestion


Diphenhydramine HCl (Benadryl)  25 mg IVPUSH Q6H PRN


   PRN Reason: pruritis


Ephedrine Sulfate (Ephedrine Sulfate)  5 mg IVPUSH ASDIRECTED PRN


   PRN Reason: Hypotension


Fentanyl (Sublimaze)  100 mcg EPIDUR Q3H PRN


   PRN Reason: Pain


   Last Admin: 04/10/20 22:25 Dose:  50 mcg


Fentanyl/Bupivacaine HCl (Fentanyl/Bupivacaine/Ns 2 Mcg-0.125% 100 Ml)  100 ml 

EPIDUR ASDIRECTED PRN


   PRN Reason: Pain


   Last Admin: 04/10/20 14:55 Dose:  100 ml


Lactated Ringer's (Ringers, Lactated)  1,000 mls @ 100 mls/hr IV ASDIRECTED MURALI


   Last Infusion: 04/10/20 16:02 Dose:  100 mls/hr


Oxytocin/Lactated Ringer's (Pitocin In Lr 10 Units/1,000 Ml)  10 unit in 1,000 

mls @ 12 mls/hr IV TITRATE MURALI; Protocol


Oxytocin/Lactated Ringer's (Pitocin In Lr 10 Units/1,000 Ml)  10 unit in 1,000 

mls @ 100 mls/hr IV .CONTINUOUS MURALI


   Last Admin: 04/10/20 21:23 Dose:  100 mls/hr


Ceftriaxone Sodium 2 gm/ (Sodium Chloride)  100 mls @ 200 mls/hr IV ONETIME ONE


   Stop: 04/11/20 04:16


   Last Admin: 04/11/20 04:00 Dose:  Not Given


Ceftriaxone Sodium 2 gm/ (Sodium Chloride)  100 mls @ 200 mls/hr IV ONETIME ONE


   Stop: 04/11/20 04:27


   Last Admin: 04/11/20 04:18 Dose:  200 mls/hr


Ibuprofen (Motrin)  600 mg PO Q6H PRN


   PRN Reason: Mild pain or fever


Iopamidol (Isovue-370 (76%))  150 ml IARTIC ONETIME ONE


   Stop: 04/11/20 07:02


   Last Admin: 04/11/20 08:45 Dose:  150 ml


Misoprostol (Cytotec) Confirm Administered Dose 200 mcg .ROUTE .STK-MED ONE


   Stop: 04/10/20 21:27


   Last Admin: 04/10/20 22:20 Dose:  600 mcg


Nalbuphine HCl (Nubain)  10 mg IVPUSH Q2H PRN


   PRN Reason: Pain


   Last Admin: 04/10/20 12:01 Dose:  10 mg


Ondansetron HCl (Zofran)  4 mg IVPUSH Q4H PRN


   PRN Reason: Nausea/Vomiting


Sodium Chloride (Saline Flush)  10 ml FLUSH ASDIRECTED PRN


   PRN Reason: Keep Vein Open











- Exam


General: Reports: Alert, Oriented


HEENT: Reports: Pupils Equal, Pupils Reactive, EOMI, Mucous Membr. Moist/Pink


Neck: Reports: Supple


Lungs: Reports: Clear to Auscultation, Other (slight increased RR - 30)


Cardiovascular: Reports: Regular Rate, Regular Rhythm


GI/Abdominal Exam: Normal Bowel Sounds, Soft, Non-Tender, No Organomegaly, No 

Distention, No Abnormal Bruit, No Mass, Pelvis Stable


 (Female) Exam: Normal Bimanual Exam


Rectal (Female) Exam: Normal Exam, Normal Rectal Tone


Back Exam: Reports: Normal Inspection, Full Range of Motion


Extremities: Normal Inspection, Normal Range of Motion, Non-Tender, No Pedal 

Edema, Normal Capillary Refill


Skin: Reports: Warm, Dry, Intact


Wound/Incisions: Reports: Healing Well


Neurological: Reports: No New Focal Deficit


Psy/Mental Status: Reports: Alert, Normal Affect, Normal Mood